# Patient Record
Sex: MALE | Race: WHITE | Employment: FULL TIME | URBAN - METROPOLITAN AREA
[De-identification: names, ages, dates, MRNs, and addresses within clinical notes are randomized per-mention and may not be internally consistent; named-entity substitution may affect disease eponyms.]

---

## 2018-04-04 DIAGNOSIS — R52 PAIN: Primary | ICD-10-CM

## 2018-04-06 ENCOUNTER — OFFICE VISIT (OUTPATIENT)
Dept: ORTHOPEDIC SURGERY | Age: 38
End: 2018-04-06
Payer: COMMERCIAL

## 2018-04-06 ENCOUNTER — HOSPITAL ENCOUNTER (OUTPATIENT)
Dept: GENERAL RADIOLOGY | Age: 38
Discharge: HOME | End: 2018-04-08
Payer: COMMERCIAL

## 2018-04-06 VITALS
TEMPERATURE: 97.3 F | RESPIRATION RATE: 18 BRPM | HEIGHT: 73 IN | SYSTOLIC BLOOD PRESSURE: 116 MMHG | BODY MASS INDEX: 31.2 KG/M2 | WEIGHT: 235.4 LBS | HEART RATE: 85 BPM | DIASTOLIC BLOOD PRESSURE: 76 MMHG

## 2018-04-06 DIAGNOSIS — R52 PAIN: ICD-10-CM

## 2018-04-06 DIAGNOSIS — M19.012 ARTHRITIS OF LEFT ACROMIOCLAVICULAR JOINT: Primary | ICD-10-CM

## 2018-04-06 PROCEDURE — 99203 OFFICE O/P NEW LOW 30 MIN: CPT

## 2018-04-06 PROCEDURE — 73030 X-RAY EXAM OF SHOULDER: CPT

## 2018-04-06 PROCEDURE — 99203 OFFICE O/P NEW LOW 30 MIN: CPT | Performed by: ORTHOPAEDIC SURGERY

## 2018-04-06 RX ORDER — DICLOFENAC SODIUM 75 MG/1
TABLET, DELAYED RELEASE ORAL
COMMUNITY
Start: 2018-03-07 | End: 2019-04-15

## 2018-04-07 PROBLEM — M19.012 ARTHRITIS OF LEFT ACROMIOCLAVICULAR JOINT: Status: ACTIVE | Noted: 2018-04-07

## 2019-03-22 ENCOUNTER — HOSPITAL ENCOUNTER (OUTPATIENT)
Age: 39
Discharge: HOME | End: 2019-03-22
Payer: COMMERCIAL

## 2019-03-22 PROCEDURE — 86003 ALLG SPEC IGE CRUDE XTRC EA: CPT

## 2019-03-22 PROCEDURE — 83516 IMMUNOASSAY NONANTIBODY: CPT

## 2019-03-24 LAB
GLIADIN ANTIBODIES IGA: 6 UNITS (ref 0–19)
GLIADIN ANTIBODIES IGG: 3 UNITS (ref 0–19)
TISSUE TRANSGLUTAMINASE ANTIBODY: 2 U/ML (ref 0–5)
TISSUE TRANSGLUTAMINASE IGA: 1 U/ML (ref 0–3)

## 2019-03-29 LAB
Lab: NORMAL
REPORT: NORMAL
THIS TEST SENT TO: NORMAL

## 2019-04-11 ENCOUNTER — HOSPITAL ENCOUNTER (EMERGENCY)
Age: 39
Discharge: HOME | End: 2019-04-11
Payer: COMMERCIAL

## 2019-04-11 VITALS
HEIGHT: 74 IN | SYSTOLIC BLOOD PRESSURE: 130 MMHG | DIASTOLIC BLOOD PRESSURE: 74 MMHG | BODY MASS INDEX: 30.67 KG/M2 | TEMPERATURE: 98.2 F | HEART RATE: 106 BPM | OXYGEN SATURATION: 98 % | WEIGHT: 239 LBS | RESPIRATION RATE: 16 BRPM

## 2019-04-11 DIAGNOSIS — S86.012A STRAIN OF LEFT ACHILLES TENDON, INITIAL ENCOUNTER: Primary | ICD-10-CM

## 2019-04-11 PROCEDURE — 99282 EMERGENCY DEPT VISIT SF MDM: CPT

## 2019-04-11 PROCEDURE — 29515 APPLICATION SHORT LEG SPLINT: CPT

## 2019-04-11 ASSESSMENT — PAIN SCALES - GENERAL: PAINLEVEL_OUTOF10: 0

## 2019-04-12 NOTE — ED PROVIDER NOTES
[facility]         Department of Emergency Medicine   ED  Provider Note  Admit Date/RoomTime: 4/11/2019  8:15 PM  ED Room: [place] [address]  Chief Complaint   Leg Injury (playing basketball felt a pop in left lower legl)    History of Present Illness   Source of history provided by:  patient and spouse/SO. History/Exam Limitations: none. Jayjay Reich is a 41 y.o. old male who has a past medical history of:   Past Medical History:   Diagnosis Date    Arthritis of left acromioclavicular joint 4/7/2018    presents to the emergency department by private vehicle, for Left lower calf pain which occured 1 hour(s) prior to arrival.  Cause of complaint: Felt a pop while playing basketball. His weight bearing status is weak. Previous injury: no. The patients tetanus status is unknown. Since onset the symptoms have been moderate in degree with ambulation. His pain is aggraveated by walking and relieved by nothing. ROS    Pertinent positives and negatives are stated within HPI, all other systems reviewed and are negative. Past Surgical History:   Procedure Laterality Date    FOREARM SURGERY      left   Social History:  reports that he has never smoked. He has quit using smokeless tobacco. He reports that he drinks about 1.8 oz of alcohol per week. He reports that he does not use drugs. Family History: family history includes No Known Problems in his father. Allergies: Pcn [penicillins]    Physical Exam           ED Triage Vitals [04/11/19 2011]   BP Temp Temp Source Pulse Resp SpO2 Height Weight   130/74 98.2 °F (36.8 °C) Oral 106 16 98 % 6' 2\" (1.88 m) 239 lb (108.4 kg)      Oxygen Saturation Interpretation: Normal.    · Constitutional:  Alert, development consistent with age. · HEENT:  NC/NT. Airway patent. · Neck:  Normal ROM. Supple. · Extremity(s):  Left: calf. Tenderness:  moderate. Swelling: None.               Calf:  No evidence of DVT seen on physical exam.. Deformity: No.                 ROM: full range with pain. Negative Holland's squeeze test.              Skin:  no erythema, rash or wounds noted. Neurovascular: Motor deficit: none. Sensory deficit: none. Pulse deficit: none. Capillary refill: normal.  · Gait:  normal.  · Lymphatics: No lymphangitis or adenopathy noted. · Neurological:  Oriented x3. Motor functions intact. Lab / Imaging Results   (All laboratory and radiology results have been personally reviewed by myself)  Labs:  No results found for this visit on 04/11/19. Imaging: All Radiology results interpreted by Radiologist unless otherwise noted. No orders to display     ED Course / Medical Decision Making   Medications - No data to display     Consults:   None    Procedure(s):   none    MDM:      Imaging were not obtained based on low  suspicion for bony injury as per history/physical findings . Patient has a clinical presentation of Achilles sprain/partial tear and will be splinted with a short leg splint with slight plantar flexion. Plan is subsequently for symptom control, limited use and  immobilization with appropriate outpatient follow-up. Counseling: The emergency provider has spoken with the patient and discussed todays results, in addition to providing specific details for the plan of care and counseling regarding the diagnosis and prognosis. Questions are answered at this time and they are agreeable with the plan. Assessment      1. Strain of left Achilles tendon, initial encounter      Plan   Discharge to home  Patient condition is good    New Medications     New Prescriptions    No medications on file     Electronically signed by RUBIN Austin CNP   DD: 4/11/19  **This report was transcribed using voice recognition software.  Every effort was made to ensure accuracy; however, inadvertent computerized transcription errors may be present.   END OF ED PROVIDER NOTE       Galina Mckeon, RUBIN - RENEE  04/11/19 2042

## 2019-04-15 ENCOUNTER — OFFICE VISIT (OUTPATIENT)
Dept: ORTHOPEDIC SURGERY | Age: 39
End: 2019-04-15
Payer: COMMERCIAL

## 2019-04-15 VITALS — HEIGHT: 74 IN | WEIGHT: 238 LBS | BODY MASS INDEX: 30.54 KG/M2 | TEMPERATURE: 98 F

## 2019-04-15 DIAGNOSIS — S86.012A RUPTURE OF LEFT ACHILLES TENDON, INITIAL ENCOUNTER: Primary | ICD-10-CM

## 2019-04-15 PROCEDURE — 99204 OFFICE O/P NEW MOD 45 MIN: CPT | Performed by: ORTHOPAEDIC SURGERY

## 2019-04-15 RX ORDER — VENLAFAXINE 25 MG/1
37.5 TABLET ORAL DAILY
COMMUNITY
End: 2020-07-15

## 2019-04-15 RX ORDER — PANTOPRAZOLE SODIUM 20 MG/1
40 TABLET, DELAYED RELEASE ORAL DAILY
COMMUNITY
End: 2019-05-02

## 2019-04-15 NOTE — PROGRESS NOTES
Chief Complaint   Patient presents with    Tendonitis     Left Achilles, pain, was playing basketball 4/12/19 and states landed wrong. Went to [place] is a 41 y.o.  @male@ who presents today with a left ankle injury. The injury occurred 4/12/19. The history of injury was from no, direct trauma. The patient complains of pain over achilles tendon. The intensity is 5/10. The pain is described as: sharp. Previous treatment includes: splinting. Past Medical History:   Diagnosis Date    Arthritis of left acromioclavicular joint 4/7/2018     Past Surgical History:   Procedure Laterality Date    FOREARM SURGERY      left       Current Outpatient Medications:     venlafaxine (EFFEXOR) 25 MG tablet, Take 25 mg by mouth 3 times daily, Disp: , Rfl:     pantoprazole (PROTONIX) 20 MG tablet, Take 20 mg by mouth daily, Disp: , Rfl:   Allergies   Allergen Reactions    Pcn [Penicillins]      Social History     Socioeconomic History    Marital status: Single     Spouse name: Not on file    Number of children: Not on file    Years of education: Not on file    Highest education level: Not on file   Occupational History    Not on file   Social Needs    Financial resource strain: Not on file    Food insecurity:     Worry: Not on file     Inability: Not on file    Transportation needs:     Medical: Not on file     Non-medical: Not on file   Tobacco Use    Smoking status: Never Smoker    Smokeless tobacco: Former User   Substance and Sexual Activity    Alcohol use:  Yes     Alcohol/week: 1.8 oz     Types: 1 Glasses of wine, 1 Cans of beer, 1 Shots of liquor per week    Drug use: No    Sexual activity: Never   Lifestyle    Physical activity:     Days per week: Not on file     Minutes per session: Not on file    Stress: Not on file   Relationships    Social connections:     Talks on phone: Not on file     Gets together: Not on file     Attends Buddhist service: Not on file Active member of club or organization: Not on file     Attends meetings of clubs or organizations: Not on file     Relationship status: Not on file    Intimate partner violence:     Fear of current or ex partner: Not on file     Emotionally abused: Not on file     Physically abused: Not on file     Forced sexual activity: Not on file   Other Topics Concern    Not on file   Social History Narrative    Not on file     Family History   Problem Relation Age of Onset    No Known Problems Father        REVIEW OF SYSTEMS:     General/Constitution:  (-)weight loss, (-)fever, (-)chills, (-)weakness. Skin: (-) rash,(-) psoriasis,(-) eczema, (-)skin cancer. Musculoskeletal: (-) fractures,  (-) dislocations,(-) collagen vascular disease, (-) fibromyalgia, (-) multiple sclerosis, (-) muscular dystrophy, (-) RSD,(-) joint pain (-)swelling, (-) joint pain,swelling. Neurologic: (-) epilepsy, (-)seizures,(-) brain tumor,(-) TIA, (-)stroke, (-)headaches, (-)Parkinson disease,(-) memory loss, (-) LOC. Cardiovascular: (-) Chest pain, (-) swelling in legs/feet, (-) SOB, (-) cramping in legs/feet with walking. Respiratory: (-) SOB, (-) Coughing, (-) night sweats. GI: (-) nausea, (-) vomiting, (-) diarrhea, (-) blood in stool, (-) gastric ulcer. Psychiatric: (-) Depression, (-) Anxiety, (-) bipolar disease, (-) Alzheimer's Disease  Allergic/Immunologic: (-) allergies latex, (-) allergies metal, (-) skin sensitivity. Hematlogic: (-) anemia, (-) blood transfusion, (-) DVT/PE, (-) Clotting disorders      EXAM:      Constitution:    Temp 98 °F (36.7 °C)   Ht 6' 2\" (1.88 m)   Wt 238 lb (108 kg)   BMI 30.56 kg/m²       Psycihatric:    The patient is alert and oriented x 3, appears to be stated age and in no distress. Respiratory:    Respiratory effort is not labored. Patient is not gasping. Palpation of the chest reveals no tactile fremitus. Skin:    Upon inspection: the skin appears warm, dry and intact.   There is not a previous scar over the affected area. There is not any cellulitis, lymphedema or cutaneous lesions noted in the lower extremities. Upon palpation there is no induration noted. Neurologic:      Motor exam of the lower extremities show ; quadriceps, hamstrings, foot dorsi and plantar flexors intact R.  5/5 and L. 5/5. Deep tendon reflexes are 2/4 at the knees and 2/4 at the ankles with strong extensor hallicus longus motor strength bilaterally. Sensory to both feet is intact to all sensory roots. Cardiovascular: The vascular exam is normal and is well perfused to distal extremities. Distal pulses DP/PT: R. 2+; L. 2+. There is cap refill noted less than two seconds in all digits. There is not edema of the bilateral lower extremities. There is not varicosities noted in the distal extremities. Lymph:    Upon palpation,  there is no lymphadenopathy noted in bilateral lower extremities. Musculoskeletal:    Gait: antalgic; examination of the nails and digits reveal no cyanosis or clubbing. Knee exam - bilateral knee exam shows;  range of motion of R. Knee is 0 to 120, and L. Knee is 0 to 120. The patient does not have  pain on motion, there is not an effusion, there is not tenderness over the  medial, lateral region, there are not any masses, there is not ligamentous instability, there is not  deformity noted. Knee exam: the injured knee reveals antalgic gait. Knee exam: neither positive for moderate crepitations, some mild tenderness laxity is not noted with  stress. Ankle Exam:    Upon inspection and palpation of the Left ankle,  there is not deformity noted,  mild swelling, mild ecchymosis, has pain on palpation of achilles tendon, + sulcus over achilles tendon. ROM R/L : DF 15/5; PF 35/10;  INV 15/5, BETITO 15/5. This exam was compared bilaterally.        Right Ankle:   (-) Anterior Drawer ,  (-) Posterior Drawer ,  (-) Squeeze test,  (-) External Rotation, (-) Eversion test , Trinity Duster Test     Left ankle:   (-) Anterior Drawer ,(-)  Posterior Drawer ,(-) Squeeze test,(-) External Rotation (-) Eversion test, (+) Holland Test.      Foot exam- visual inspection reveals warm, good capillary refill, there is not pain to palpation over the foot. ROM inversion/eversion full range of motion, abduction/adduction full range of motion, ROM in MTP/PIP/DIP full range of motion. Xrays:none today    Radiographic findings reviewed with patient      Impression:  Encounter Diagnosis   Name Primary?  Rupture of left Achilles tendon, initial encounter Yes         Plan:Natural history and expected course discussed. Questions answered. Rest, ice, compression, elevation (RICE) therapy. Crutches and instructions provided. The risks and benefits of the surgery were discussed with the patient. The risks are including but not limited to: infection, injuries to blood vessels and nerves, non relief of symptoms, arthrofibrosis of knee, need for further operative intervention, blood loss, PE/DVT, MI and death. The risks are understood by the patient and they wish to proceed with a Left achilles tendon repair on 4/18/19. Greater than 45 minutes were spent discussing diagnosis and treatment options with the patient with more than 50% of the time spent counseling and decision making.

## 2019-04-16 ENCOUNTER — ANESTHESIA EVENT (OUTPATIENT)
Dept: OPERATING ROOM | Age: 39
End: 2019-04-16
Payer: COMMERCIAL

## 2019-04-17 ASSESSMENT — LIFESTYLE VARIABLES: SMOKING_STATUS: 1

## 2019-04-17 NOTE — ANESTHESIA PRE PROCEDURE
Department of Anesthesiology  Preprocedure Note       Name:  Lidia Jordan   Age:  41 y.o.  :  1980                                          MRN:  [id number]         Date:  2019      Surgeon: Yung Suarez): Boby Males, DO    Procedure: LEFT ANKLE ACHILLES TENDON REPAIR ([address]) (Left )    Medications prior to admission:   Prior to Admission medications    Medication Sig Start Date End Date Taking? Authorizing Provider   venlafaxine (EFFEXOR) 25 MG tablet Take 25 mg by mouth daily     Historical Provider, MD   pantoprazole (PROTONIX) 20 MG tablet Take 20 mg by mouth daily    Historical Provider, MD       Current medications:    No current facility-administered medications for this encounter. Current Outpatient Medications   Medication Sig Dispense Refill    venlafaxine (EFFEXOR) 25 MG tablet Take 25 mg by mouth daily       pantoprazole (PROTONIX) 20 MG tablet Take 20 mg by mouth daily         Allergies:     Allergies   Allergen Reactions    Pcn [Penicillins]      Not sure reaction       Problem List:    Patient Active Problem List   Diagnosis Code    Arthritis of left acromioclavicular joint M19.012       Past Medical History:        Diagnosis Date    Arthritis of left acromioclavicular joint 2018    GERD (gastroesophageal reflux disease)        Past Surgical History:        Procedure Laterality Date    ENDOSCOPY, COLON, DIAGNOSTIC      dilatation esophagus    FOREARM SURGERY      left fracture       Social History:    Social History     Tobacco Use    Smoking status: Current Every Day Smoker     Years: 6.00     Types: Cigarettes    Smokeless tobacco: Former User     Types: Chew   Substance Use Topics    Alcohol use: Yes     Comment: social                                Ready to quit: Not Answered  Counseling given: Not Answered      Vital Signs (Current):   Vitals:    19 1035   Weight: 240 lb (108.9 kg)   Height: 6' 2\" (1.88 m)                                              BP Readings from Last 3 Encounters:   04/11/19 130/74   04/06/18 116/76   06/13/17 127/75       NPO Status:  >8.H                                                                               BMI:   Wt Readings from Last 3 Encounters:   04/15/19 238 lb (108 kg)   04/11/19 239 lb (108.4 kg)   04/06/18 235 lb 6.4 oz (106.8 kg)     Body mass index is 30.81 kg/m². CBC: No results found for: WBC, RBC, HGB, HCT, MCV, RDW, PLT    CMP: No results found for: NA, K, CL, CO2, BUN, CREATININE, GFRAA, AGRATIO, LABGLOM, GLUCOSE, PROT, CALCIUM, BILITOT, ALKPHOS, AST, ALT    POC Tests: No results for input(s): POCGLU, POCNA, POCK, POCCL, POCBUN, POCHEMO, POCHCT in the last 72 hours. Coags: No results found for: PROTIME, INR, APTT    HCG (If Applicable): No results found for: PREGTESTUR, PREGSERUM, HCG, HCGQUANT     ABGs: No results found for: PHART, PO2ART, VSY4IVK, TDT1OPG, BEART, E1HULEYT     Type & Screen (If Applicable):  No results found for: LABABO, [address]    Anesthesia Evaluation  Patient summary reviewed no history of anesthetic complications:   Airway: Mallampati: I  TM distance: >3 FB   Neck ROM: full  Comment: beard  Mouth opening: > = 3 FB Dental:    (+) caps      Pulmonary: breath sounds clear to auscultation  (+) current smoker          Patient did not smoke on day of surgery. Cardiovascular:Negative CV ROS  Exercise tolerance: good (>4 METS),           Rhythm: regular  Rate: normal                    Neuro/Psych:   Negative Neuro/Psych ROS              GI/Hepatic/Renal:   (+) GERD:,           Endo/Other:    (+) : arthritis:., .                 Abdominal:         (-) obese     Vascular: negative vascular ROS. Anesthesia Plan      spinal     ASA 2     (Possible SAB--explained fully; consents  Prone?)  Induction: intravenous. Anesthetic plan and risks discussed with patient. Plan discussed with CRNA.               Liu Tee MD   4/17/2019

## 2019-04-18 ENCOUNTER — ANESTHESIA (OUTPATIENT)
Dept: OPERATING ROOM | Age: 39
End: 2019-04-18
Payer: COMMERCIAL

## 2019-04-18 ENCOUNTER — HOSPITAL ENCOUNTER (OUTPATIENT)
Age: 39
Setting detail: OUTPATIENT SURGERY
Discharge: HOME | End: 2019-04-18
Attending: ORTHOPAEDIC SURGERY | Admitting: ORTHOPAEDIC SURGERY
Payer: COMMERCIAL

## 2019-04-18 VITALS
DIASTOLIC BLOOD PRESSURE: 75 MMHG | SYSTOLIC BLOOD PRESSURE: 110 MMHG | OXYGEN SATURATION: 99 % | WEIGHT: 240 LBS | HEART RATE: 88 BPM | RESPIRATION RATE: 18 BRPM | HEIGHT: 74 IN | BODY MASS INDEX: 30.8 KG/M2 | TEMPERATURE: 98 F

## 2019-04-18 VITALS
RESPIRATION RATE: 12 BRPM | DIASTOLIC BLOOD PRESSURE: 56 MMHG | SYSTOLIC BLOOD PRESSURE: 108 MMHG | TEMPERATURE: 98.6 F | OXYGEN SATURATION: 96 %

## 2019-04-18 DIAGNOSIS — S86.012A RUPTURE OF LEFT ACHILLES TENDON, INITIAL ENCOUNTER: ICD-10-CM

## 2019-04-18 PROCEDURE — 27650 REPAIR ACHILLES TENDON: CPT | Performed by: ORTHOPAEDIC SURGERY

## 2019-04-18 PROCEDURE — 6360000002 HC RX W HCPCS: Performed by: NURSE ANESTHETIST, CERTIFIED REGISTERED

## 2019-04-18 PROCEDURE — 2580000003 HC RX 258: Performed by: ANESTHESIOLOGY

## 2019-04-18 PROCEDURE — 7100000011 HC PHASE II RECOVERY - ADDTL 15 MIN: Performed by: ORTHOPAEDIC SURGERY

## 2019-04-18 PROCEDURE — 2500000003 HC RX 250 WO HCPCS: Performed by: NURSE PRACTITIONER

## 2019-04-18 PROCEDURE — 2500000003 HC RX 250 WO HCPCS: Performed by: NURSE ANESTHETIST, CERTIFIED REGISTERED

## 2019-04-18 PROCEDURE — 2709999900 HC NON-CHARGEABLE SUPPLY: Performed by: ORTHOPAEDIC SURGERY

## 2019-04-18 PROCEDURE — 7100000000 HC PACU RECOVERY - FIRST 15 MIN: Performed by: ORTHOPAEDIC SURGERY

## 2019-04-18 PROCEDURE — 7100000010 HC PHASE II RECOVERY - FIRST 15 MIN: Performed by: ORTHOPAEDIC SURGERY

## 2019-04-18 PROCEDURE — 3600000012 HC SURGERY LEVEL 2 ADDTL 15MIN: Performed by: ORTHOPAEDIC SURGERY

## 2019-04-18 PROCEDURE — C1713 ANCHOR/SCREW BN/BN,TIS/BN: HCPCS | Performed by: ORTHOPAEDIC SURGERY

## 2019-04-18 PROCEDURE — 3600000002 HC SURGERY LEVEL 2 BASE: Performed by: ORTHOPAEDIC SURGERY

## 2019-04-18 PROCEDURE — 7100000001 HC PACU RECOVERY - ADDTL 15 MIN: Performed by: ORTHOPAEDIC SURGERY

## 2019-04-18 PROCEDURE — 3700000001 HC ADD 15 MINUTES (ANESTHESIA): Performed by: ORTHOPAEDIC SURGERY

## 2019-04-18 PROCEDURE — 3700000000 HC ANESTHESIA ATTENDED CARE: Performed by: ORTHOPAEDIC SURGERY

## 2019-04-18 DEVICE — SYSTEM IMPL TEND 4.75MM SWIVELOCK BAN SUT LASSO W/ NIT WIRE: Type: IMPLANTABLE DEVICE | Status: FUNCTIONAL

## 2019-04-18 RX ORDER — CLINDAMYCIN HYDROCHLORIDE 300 MG/1
300 CAPSULE ORAL 3 TIMES DAILY
Qty: 10 CAPSULE | Refills: 0 | Status: SHIPPED | OUTPATIENT
Start: 2019-04-18 | End: 2019-04-22

## 2019-04-18 RX ORDER — LABETALOL HYDROCHLORIDE 5 MG/ML
5 INJECTION, SOLUTION INTRAVENOUS EVERY 10 MIN PRN
Status: DISCONTINUED | OUTPATIENT
Start: 2019-04-18 | End: 2019-04-18 | Stop reason: HOSPADM

## 2019-04-18 RX ORDER — HYDROMORPHONE HYDROCHLORIDE 1 MG/ML
0.25 INJECTION, SOLUTION INTRAMUSCULAR; INTRAVENOUS; SUBCUTANEOUS EVERY 5 MIN PRN
Status: DISCONTINUED | OUTPATIENT
Start: 2019-04-18 | End: 2019-04-18 | Stop reason: HOSPADM

## 2019-04-18 RX ORDER — PROPOFOL 10 MG/ML
INJECTION, EMULSION INTRAVENOUS PRN
Status: DISCONTINUED | OUTPATIENT
Start: 2019-04-18 | End: 2019-04-18 | Stop reason: SDUPTHER

## 2019-04-18 RX ORDER — FENTANYL CITRATE 50 UG/ML
50 INJECTION, SOLUTION INTRAMUSCULAR; INTRAVENOUS EVERY 5 MIN PRN
Status: DISCONTINUED | OUTPATIENT
Start: 2019-04-18 | End: 2019-04-18 | Stop reason: HOSPADM

## 2019-04-18 RX ORDER — ONDANSETRON 2 MG/ML
INJECTION INTRAMUSCULAR; INTRAVENOUS PRN
Status: DISCONTINUED | OUTPATIENT
Start: 2019-04-18 | End: 2019-04-18 | Stop reason: SDUPTHER

## 2019-04-18 RX ORDER — MORPHINE SULFATE 2 MG/ML
1 INJECTION, SOLUTION INTRAMUSCULAR; INTRAVENOUS EVERY 5 MIN PRN
Status: DISCONTINUED | OUTPATIENT
Start: 2019-04-18 | End: 2019-04-18 | Stop reason: HOSPADM

## 2019-04-18 RX ORDER — PROPOFOL 10 MG/ML
INJECTION, EMULSION INTRAVENOUS CONTINUOUS PRN
Status: DISCONTINUED | OUTPATIENT
Start: 2019-04-18 | End: 2019-04-18 | Stop reason: SDUPTHER

## 2019-04-18 RX ORDER — SODIUM CHLORIDE 9 MG/ML
INJECTION, SOLUTION INTRAVENOUS CONTINUOUS PRN
Status: DISCONTINUED | OUTPATIENT
Start: 2019-04-18 | End: 2019-04-18 | Stop reason: SDUPTHER

## 2019-04-18 RX ORDER — DIPHENHYDRAMINE HYDROCHLORIDE 50 MG/ML
12.5 INJECTION INTRAMUSCULAR; INTRAVENOUS
Status: DISCONTINUED | OUTPATIENT
Start: 2019-04-18 | End: 2019-04-18 | Stop reason: HOSPADM

## 2019-04-18 RX ORDER — MEPERIDINE HYDROCHLORIDE 50 MG/ML
12.5 INJECTION INTRAMUSCULAR; INTRAVENOUS; SUBCUTANEOUS EVERY 5 MIN PRN
Status: DISCONTINUED | OUTPATIENT
Start: 2019-04-18 | End: 2019-04-18 | Stop reason: HOSPADM

## 2019-04-18 RX ORDER — CLINDAMYCIN PHOSPHATE 900 MG/50ML
900 INJECTION INTRAVENOUS ONCE
Status: COMPLETED | OUTPATIENT
Start: 2019-04-18 | End: 2019-04-18

## 2019-04-18 RX ORDER — FENTANYL CITRATE 50 UG/ML
INJECTION, SOLUTION INTRAMUSCULAR; INTRAVENOUS PRN
Status: DISCONTINUED | OUTPATIENT
Start: 2019-04-18 | End: 2019-04-18 | Stop reason: SDUPTHER

## 2019-04-18 RX ORDER — OXYCODONE HYDROCHLORIDE AND ACETAMINOPHEN 5; 325 MG/1; MG/1
1 TABLET ORAL EVERY 6 HOURS PRN
Qty: 28 TABLET | Refills: 0 | Status: SHIPPED | OUTPATIENT
Start: 2019-04-18 | End: 2019-04-25

## 2019-04-18 RX ORDER — SODIUM CHLORIDE, SODIUM LACTATE, POTASSIUM CHLORIDE, CALCIUM CHLORIDE 600; 310; 30; 20 MG/100ML; MG/100ML; MG/100ML; MG/100ML
INJECTION, SOLUTION INTRAVENOUS CONTINUOUS
Status: DISCONTINUED | OUTPATIENT
Start: 2019-04-18 | End: 2019-04-18 | Stop reason: HOSPADM

## 2019-04-18 RX ORDER — PROMETHAZINE HYDROCHLORIDE 25 MG/ML
25 INJECTION, SOLUTION INTRAMUSCULAR; INTRAVENOUS
Status: DISCONTINUED | OUTPATIENT
Start: 2019-04-18 | End: 2019-04-18 | Stop reason: HOSPADM

## 2019-04-18 RX ORDER — LIDOCAINE HYDROCHLORIDE 20 MG/ML
INJECTION, SOLUTION INFILTRATION; PERINEURAL PRN
Status: DISCONTINUED | OUTPATIENT
Start: 2019-04-18 | End: 2019-04-18 | Stop reason: SDUPTHER

## 2019-04-18 RX ORDER — HYDROCODONE BITARTRATE AND ACETAMINOPHEN 5; 325 MG/1; MG/1
2 TABLET ORAL PRN
Status: DISCONTINUED | OUTPATIENT
Start: 2019-04-18 | End: 2019-04-18 | Stop reason: HOSPADM

## 2019-04-18 RX ORDER — HYDROCODONE BITARTRATE AND ACETAMINOPHEN 5; 325 MG/1; MG/1
1 TABLET ORAL PRN
Status: DISCONTINUED | OUTPATIENT
Start: 2019-04-18 | End: 2019-04-18 | Stop reason: HOSPADM

## 2019-04-18 RX ORDER — MIDAZOLAM HYDROCHLORIDE 1 MG/ML
INJECTION INTRAMUSCULAR; INTRAVENOUS PRN
Status: DISCONTINUED | OUTPATIENT
Start: 2019-04-18 | End: 2019-04-18 | Stop reason: SDUPTHER

## 2019-04-18 RX ORDER — HYDRALAZINE HYDROCHLORIDE 20 MG/ML
5 INJECTION INTRAMUSCULAR; INTRAVENOUS EVERY 10 MIN PRN
Status: DISCONTINUED | OUTPATIENT
Start: 2019-04-18 | End: 2019-04-18 | Stop reason: HOSPADM

## 2019-04-18 RX ADMIN — SODIUM CHLORIDE: 9 INJECTION, SOLUTION INTRAVENOUS at 15:40

## 2019-04-18 RX ADMIN — FENTANYL CITRATE 50 MCG: 50 INJECTION, SOLUTION INTRAMUSCULAR; INTRAVENOUS at 14:49

## 2019-04-18 RX ADMIN — ONDANSETRON HYDROCHLORIDE 4 MG: 2 INJECTION, SOLUTION INTRAMUSCULAR; INTRAVENOUS at 15:02

## 2019-04-18 RX ADMIN — FENTANYL CITRATE 50 MCG: 50 INJECTION, SOLUTION INTRAMUSCULAR; INTRAVENOUS at 14:46

## 2019-04-18 RX ADMIN — CLINDAMYCIN PHOSPHATE 900 MG: 18 INJECTION, SOLUTION INTRAVENOUS at 14:39

## 2019-04-18 RX ADMIN — LIDOCAINE HYDROCHLORIDE 25 MG: 20 INJECTION, SOLUTION INFILTRATION; PERINEURAL at 14:55

## 2019-04-18 RX ADMIN — MIDAZOLAM 2 MG: 1 INJECTION INTRAMUSCULAR; INTRAVENOUS at 14:44

## 2019-04-18 RX ADMIN — PROPOFOL 50 MG: 10 INJECTION, EMULSION INTRAVENOUS at 14:55

## 2019-04-18 RX ADMIN — SODIUM CHLORIDE, POTASSIUM CHLORIDE, SODIUM LACTATE AND CALCIUM CHLORIDE: 600; 310; 30; 20 INJECTION, SOLUTION INTRAVENOUS at 13:08

## 2019-04-18 RX ADMIN — PROPOFOL 100 MCG/KG/MIN: 10 INJECTION, EMULSION INTRAVENOUS at 14:55

## 2019-04-18 ASSESSMENT — PULMONARY FUNCTION TESTS
PIF_VALUE: 0

## 2019-04-18 ASSESSMENT — PAIN SCALES - GENERAL
PAINLEVEL_OUTOF10: 0

## 2019-04-18 ASSESSMENT — PAIN - FUNCTIONAL ASSESSMENT: PAIN_FUNCTIONAL_ASSESSMENT: 0-10

## 2019-04-18 NOTE — H&P
Updated H&P    Chief Complaint   Patient presents with    Tendonitis       Left Achilles, pain, was playing basketball 4/12/19 and states landed wrong. Went to [employer]         Gregorio Hazel is a 41 y.o.  @male@ who presents today with a left ankle injury. The injury occurred 4/12/19. The history of injury was from no, direct trauma. The patient complains of pain over achilles tendon. The intensity is 5/10. The pain is described as: sharp. Previous treatment includes: splinting.       Past Medical History        Past Medical History:   Diagnosis Date    Arthritis of left acromioclavicular joint 4/7/2018         Past Surgical History   Past Surgical History:   Procedure Laterality Date    FOREARM SURGERY         left           Current Medication      Current Outpatient Medications:     venlafaxine (EFFEXOR) 25 MG tablet, Take 25 mg by mouth 3 times daily, Disp: , Rfl:     pantoprazole (PROTONIX) 20 MG tablet, Take 20 mg by mouth daily, Disp: , Rfl:           Allergies   Allergen Reactions    Pcn [Penicillins]        Social History               Socioeconomic History    Marital status: Single       Spouse name: Not on file    Number of children: Not on file    Years of education: Not on file    Highest education level: Not on file   Occupational History    Not on file   Social Needs    Financial resource strain: Not on file    Food insecurity:       Worry: Not on file       Inability: Not on file    Transportation needs:       Medical: Not on file       Non-medical: Not on file   Tobacco Use    Smoking status: Never Smoker    Smokeless tobacco: Former User   Substance and Sexual Activity    Alcohol use:  Yes       Alcohol/week: 1.8 oz       Types: 1 Glasses of wine, 1 Cans of beer, 1 Shots of liquor per week    Drug use: No    Sexual activity: Never   Lifestyle    Physical activity:       Days per week: Not on file       Minutes per session: Not on file    Stress: Not on file Relationships    Social connections:       Talks on phone: Not on file       Gets together: Not on file       Attends Hindu service: Not on file       Active member of club or organization: Not on file       Attends meetings of clubs or organizations: Not on file       Relationship status: Not on file    Intimate partner violence:       Fear of current or ex partner: Not on file       Emotionally abused: Not on file       Physically abused: Not on file       Forced sexual activity: Not on file   Other Topics Concern    Not on file   Social History Narrative    Not on file         Family History         Family History   Problem Relation Age of Onset    No Known Problems Father              REVIEW OF SYSTEMS:      General/Constitution:  (-)weight loss, (-)fever, (-)chills, (-)weakness. Skin: (-) rash,(-) psoriasis,(-) eczema, (-)skin cancer. Musculoskeletal: (-) fractures,  (-) dislocations,(-) collagen vascular disease, (-) fibromyalgia, (-) multiple sclerosis, (-) muscular dystrophy, (-) RSD,(-) joint pain (-)swelling, (-) joint pain,swelling. Neurologic: (-) epilepsy, (-)seizures,(-) brain tumor,(-) TIA, (-)stroke, (-)headaches, (-)Parkinson disease,(-) memory loss, (-) LOC. Cardiovascular: (-) Chest pain, (-) swelling in legs/feet, (-) SOB, (-) cramping in legs/feet with walking. Respiratory: (-) SOB, (-) Coughing, (-) night sweats. GI: (-) nausea, (-) vomiting, (-) diarrhea, (-) blood in stool, (-) gastric ulcer. Psychiatric: (-) Depression, (-) Anxiety, (-) bipolar disease, (-) Alzheimer's Disease  Allergic/Immunologic: (-) allergies latex, (-) allergies metal, (-) skin sensitivity. Hematlogic: (-) anemia, (-) blood transfusion, (-) DVT/PE, (-) Clotting disorders        EXAM:      _BP (!) 152/83   Pulse 75   Resp 16   Ht 6' 2\" (1.88 m)   Wt 240 lb (108.9 kg)   SpO2 99%   BMI 30.81 kg/m²  Vital signs are stable.   In general, patient is awake, alert and oriented X3, in no apparent clubbing.        Knee exam - bilateral knee exam shows;  range of motion of R. Knee is 0 to 120, and L. Knee is 0 to 120. The patient does not have  pain on motion, there is not an effusion, there is not tenderness over the  medial, lateral region, there are not any masses, there is not ligamentous instability, there is not  deformity noted. Knee exam: the injured knee reveals antalgic gait. Knee exam: neither positive for moderate crepitations, some mild tenderness laxity is not noted with  stress.      Ankle Exam:     Upon inspection and palpation of the Left ankle,  there is not deformity noted,  mild swelling, mild ecchymosis, has pain on palpation of achilles tendon, + sulcus over achilles tendon. ROM R/L : DF 15/5; PF 35/10;  INV 15/5, BETITO 15/5. This exam was compared bilaterally.        Right Ankle:   (-) Anterior Drawer ,  (-) Posterior Drawer ,  (-) Squeeze test,  (-) External Rotation, (-) Eversion test , (-) Holland Test      Left ankle:   (-) Anterior Drawer ,(-)  Posterior Drawer ,(-) Squeeze test,(-) External Rotation (-) Eversion test, (+) Holland Test.        Foot exam- visual inspection reveals warm, good capillary refill, there is not pain to palpation over the foot. ROM inversion/eversion full range of motion, abduction/adduction full range of motion, ROM in MTP/PIP/DIP full range of motion.        Xrays:none today     Radiographic findings reviewed with patient        Impression:       Encounter Diagnosis   Name Primary?  Rupture of left Achilles tendon, initial encounter Yes            Plan:Natural history and expected course discussed. Questions answered. Rest, ice, compression, elevation (RICE) therapy. Crutches and instructions provided. The risks and benefits of the surgery were discussed with the patient.   The risks are including but not limited to: infection, injuries to blood vessels and nerves, non relief of symptoms, arthrofibrosis of knee, need for further operative intervention, blood loss, PE/DVT, MI and death. The risks are understood by the patient and they wish to proceed with a Left achilles tendon repair on 4/18/19.

## 2019-04-18 NOTE — ANESTHESIA POSTPROCEDURE EVALUATION
Department of Anesthesiology  Postprocedure Note    Patient: Mau Damon  MRN: [de-identified]  YOB: 1980  Date of evaluation: 4/18/2019  Time:  5:16 PM     Procedure Summary     Date:  04/18/19 Room / Location:  [address] [place] [address] 02 / [address] [facility]    Anesthesia Start:  0559 Anesthesia Stop:  1634    Procedure:  LEFT ANKLE ACHILLES TENDON REPAIR ([address]) (Left ) Diagnosis:  (ACHILLES TENDON RUPTURE)    Surgeon:  Sheila Arango DO Responsible Provider:  Senia Patel MD    Anesthesia Type:  spinal ASA Status:  2          Anesthesia Type: spinal    Waylon Phase I: Waylon Score: 9    Waylon Phase II:      Last vitals: Reviewed and per EMR flowsheets. Anesthesia Post Evaluation    Patient location during evaluation: PACU  Patient participation: complete - patient participated  Level of consciousness: awake and alert  Airway patency: patent  Nausea & Vomiting: no nausea and no vomiting  Complications: no  Cardiovascular status: hemodynamically stable  Respiratory status: room air and spontaneous ventilation  Hydration status: stable  Comments: Minimal motor block; some sensory block at this time in PACU.

## 2019-04-18 NOTE — BRIEF OP NOTE
Brief Postoperative Note  ______________________________________________________________    Patient: Michelle Chandler  YOB: 1980  MRN: [id number]  Date of Procedure: 4/18/2019    Pre-Op Diagnosis: ACHILLES TENDON RUPTURE    Post-Op Diagnosis: Same       Procedure(s):  LEFT ANKLE ACHILLES TENDON REPAIR ( [address])    Anesthesia: Spinal    Surgeon(s): Dinah Contreras DO    Assistant: ariana    Estimated Blood Loss (mL): less than 50     Complications: None    Specimens:   * No specimens in log *    Implants:  Implant Name Type Inv.  Item Serial No.  Lot No. LRB No. Used   IMPL ACHILLES MID SPEEDBRIDGE Fastener IMPL ACHILLES MID SPEEDBRIDGE  [address] [id number] Left 1         Drains: * No LDAs found *    Findings: as above    Dinah Contreras DO  Date: 4/18/2019  Time: 4:44 PM

## 2019-04-18 NOTE — ANESTHESIA PROCEDURE NOTES
Spinal Block    Patient location during procedure: OR  Start time: 4/18/2019 2:45 PM  End time: 4/18/2019 2:52 PM  Reason for block: primary anesthetic  Staffing  Anesthesiologist: Axel Cee MD  Performed: anesthesiologist   Preanesthetic Checklist  Completed: patient identified, site marked, surgical consent, pre-op evaluation, timeout performed, IV checked, risks and benefits discussed, monitors and equipment checked, anesthesia consent given, oxygen available and patient being monitored  Spinal Block  Patient position: sitting  Prep: Betadine  Patient monitoring: cardiac monitor, continuous pulse ox and frequent blood pressure checks  Approach: midline  Location: L3/L4  Provider prep: mask and sterile gloves  Agent: bupivacaine  Dose: 1.8  Dose: 1.8  Needle  Needle type: Quincke   Needle gauge: 25 G  Needle length: 3.5 in  Needle insertion depth: 4 cm  Assessment  Sensory level: T10  Events: cerebrospinal fluid  Swirl obtained: Yes  CSF: clear  Attempts: 2  Hemodynamics: stable  Additional Notes  13.5 mg Marcaine

## 2019-04-19 NOTE — OP NOTE
[address]                                OPERATIVE REPORT    PATIENT NAME: Sumanth Yoon                      :        1980  MED REC NO:   [id number]                            ROOM:  ACCOUNT NO:   [de-identified]                           ADMIT DATE: 2019  PROVIDER:     Yang Sullivan DO    DATE OF PROCEDURE:  2019    PREOPERATIVE DIAGNOSIS:  Left Achilles rupture. POSTOPERATIVE DIAGNOSIS:  Left Achilles rupture. PROCEDURE PERFORMED:  Repair of Achilles tendon primarily. SURGEON:  Yang Sullivan DO    ASSISTANT:  Louella Alpers. Debra Copeland and Bebe Bermeo. BLOOD LOSS:  Minimal.    COMPLICATIONS AND OPERATIVE IMPLANTS:  Include two FiberTapes along with  two SwiveLock anchors. OPERATIVE PROCEDURE:  The patient was taken to the operative suite, was  then given a spinal anesthesia. The patient was then positioned prone  on the operative table. We then placed tourniquet on the left thigh,  prepped and draped the leg in sterile fashion. Outlined incision along  the medial side of the tear, made approximately 4-6 cm incision over  this area, after exsanguinating the limb and _____ tourniquet to 300  mmHg, made incision with a 15 blade through skin and subcutaneous  tissue. I dissected down to the level of the tendon. Paratenon was  then released slightly along the rupture. I debrided both the proximal  and distal ends. There was quite bit of damage to the Achilles tendon. I then released the paratenon from the proximal stump. I performed  suturing of this tendon in a Krackow fashion proximally enough distally  on both the medial and lateral side and had four suture ends coming up  from the proximal aspect of the tendon. I then identified a point on  the calcaneus, made a transverse incision directly just distal to the  insertion of the Achilles.   I dissected down through the level of the  bone. I then tunneled our suture passer up through the distal end of  the tendon from the incision area distally out the distal end of the  tendon. I then grasped two core sutures on the medial and lateral sides  and they were shuttled through the tendon along its course along the  distal tendon. I then slightly plantarflexed the foot, put tension  across the tendon. Ten edges were then reapproximated in a nice  fashion. I then reinforced the repair with a SutureTape in a baseball  type stitch figure-of-eight and then ran it from medial to lateral.  I  was able to get good fixation of the tendon with good bite. I was then  able to thoroughly irrigate the wound upon closure, closed the paratenon  stitch over the tendon with 3-0 Vicryl, closed the subcu layer with 3-0  Vicryl followed by 3-0 Prolene. Sterile dressing was placed on the  wound. The patient was placed in the posterior splint. The patient  recovered in the recovery room without difficulty. The patient  tolerated the procedure well.         Mis Tinsley DO    D: 04/18/2019 16:49:31       T: 04/19/2019 2:32:39     [email or web address]  Job#: [id number]     Doc#: [id number]    CC:

## 2019-04-22 ENCOUNTER — TELEPHONE (OUTPATIENT)
Dept: ORTHOPEDIC SURGERY | Age: 39
End: 2019-04-22

## 2019-04-22 DIAGNOSIS — S86.012A RUPTURE OF LEFT ACHILLES TENDON, INITIAL ENCOUNTER: Primary | ICD-10-CM

## 2019-04-22 RX ORDER — OXYCODONE AND ACETAMINOPHEN 10; 325 MG/1; MG/1
1 TABLET ORAL EVERY 6 HOURS PRN
Qty: 28 TABLET | Refills: 0 | Status: SHIPPED | OUTPATIENT
Start: 2019-04-22 | End: 2019-04-29

## 2019-05-02 ENCOUNTER — OFFICE VISIT (OUTPATIENT)
Dept: ORTHOPEDIC SURGERY | Age: 39
End: 2019-05-02

## 2019-05-02 VITALS — BODY MASS INDEX: 31.54 KG/M2 | TEMPERATURE: 98 F | WEIGHT: 238 LBS | HEIGHT: 73 IN

## 2019-05-02 DIAGNOSIS — S86.012A RUPTURE OF LEFT ACHILLES TENDON, INITIAL ENCOUNTER: Primary | ICD-10-CM

## 2019-05-02 PROCEDURE — 99024 POSTOP FOLLOW-UP VISIT: CPT | Performed by: ORTHOPAEDIC SURGERY

## 2019-05-02 RX ORDER — PANTOPRAZOLE SODIUM 40 MG/1
40 TABLET, DELAYED RELEASE ORAL DAILY
COMMUNITY
Start: 2019-04-20

## 2019-05-30 ENCOUNTER — OFFICE VISIT (OUTPATIENT)
Dept: ORTHOPEDIC SURGERY | Age: 39
End: 2019-05-30

## 2019-05-30 VITALS — WEIGHT: 237 LBS | TEMPERATURE: 98 F | HEIGHT: 73 IN | BODY MASS INDEX: 31.41 KG/M2

## 2019-05-30 DIAGNOSIS — S86.012A RUPTURE OF LEFT ACHILLES TENDON, INITIAL ENCOUNTER: Primary | ICD-10-CM

## 2019-05-30 PROCEDURE — 99024 POSTOP FOLLOW-UP VISIT: CPT | Performed by: ORTHOPAEDIC SURGERY

## 2019-05-30 NOTE — PROGRESS NOTES
Mr. Francisco Garcia returns today for follow-up from achilles tendon repair. Date of surgery was 4/18/19. he reports he is doing well, nonweightbearing. Lucia Akers Physical Exam:  Left lower extremity - Skin intact with healed incision         Nontender to palpation, - monzon         ROM   limited         The incision is healing well without evidence of infection. Pulses are intact and symmetric bilaterally         Strength limited         Sensation intact    Xrays:none today    Radiographic findings reviewed with patient    Impression:   Encounter Diagnosis   Name Primary?     Rupture of left Achilles tendon, initial encounter Yes         Plan:   Continue boot for 2 weeks then wean out  Start [facility] to start weight bearing as tolerated   FU in 2 months

## 2019-06-11 ENCOUNTER — EVALUATION (OUTPATIENT)
Dept: PHYSICAL THERAPY | Age: 39
End: 2019-06-11
Payer: COMMERCIAL

## 2019-06-11 DIAGNOSIS — S86.012A RUPTURE OF LEFT ACHILLES TENDON, INITIAL ENCOUNTER: Primary | ICD-10-CM

## 2019-06-11 PROCEDURE — 97110 THERAPEUTIC EXERCISES: CPT | Performed by: PHYSICAL THERAPIST

## 2019-06-11 PROCEDURE — 97162 PT EVAL MOD COMPLEX 30 MIN: CPT | Performed by: PHYSICAL THERAPIST

## 2019-06-11 NOTE — PROGRESS NOTES
[address] [facility] OUTPATIENT REHABILITATION  PHYSICAL THERAPY INITIAL EVALUATION         Date:  2019   Patient: Nathaniel Fraire  : 1980  MRN: [id number]  Referring Provider: Isabelle Dennis DO  [address]  [facility]     Medical Diagnosis:      Diagnosis Orders   1. Rupture of left Achilles tendon, initial encounter         SUBJECTIVE:     Onset date: 2019    Surgery date: 2019     PREOPERATIVE DIAGNOSIS:  Left Achilles rupture.     POSTOPERATIVE DIAGNOSIS:  Left Achilles rupture.     PROCEDURE PERFORMED:  Repair of Achilles tendon primarily. Mechanism of Injury: Playing basketball. Patient was NWB x 6 weeks. In boot WBAT x 2 weeks. Chief complaint: decreased motion, decreased mobility, weakness, inability to use leg, difficulty walking, limited ability to lift/carry/handle material and limited ability to complete home/outdoor chores/tasks    Pain: intermittent  Current: 0/10     Best: 0/10     Worst:3/10 (at heel with walking; thinks it's due to the boot)    Symptom Type/Quality: aching  Location[de-identified] Ankle: heel      Past Medical History:  Past Medical History:   Diagnosis Date    Arthritis of left acromioclavicular joint 2018    GERD (gastroesophageal reflux disease)      Past Surgical History:   Procedure Laterality Date    ACHILLES TENDON SURGERY Left 2019    LEFT ANKLE ACHILLES TENDON REPAIR ([address]) performed by Isabelle Dennis DO at [address] Left 2019    achilles tendon repair    ENDOSCOPY, COLON, DIAGNOSTIC      dilatation esophagus    FOREARM SURGERY      left fracture       Medications:   Current Outpatient Medications   Medication Sig Dispense Refill    pantoprazole (PROTONIX) 40 MG tablet       venlafaxine (EFFEXOR) 25 MG tablet Take 37.5 mg by mouth daily        No current facility-administered medications for this visit. 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 Yvonne Patel  Physical demands include: lifting, carrying, pushing, pulling medium weighted items, assorted work positions (kneeling, crouching, crawling, stooping), walking, standing. Exercise regimen: cardio, gym workouts/weight training , running    Hobbies: basketball    Patient Goals: return to prior activity, full use of leg    Precautions/Contraindications: recent surgery    OBJECTIVE:     Observations: well nourished male, normal affect    Inspection: normal orthopedic exam    Edema: moderate medial, lateral, forefoot    Gait: normal, with boot    Joint/Motion:    Ankle:  Right:   AROM: 10° Dorsiflexion,  50° Plantarflexion, 30° Inversion, 10° Eversion    Left:   AROM: 0° Dorsiflexion,  30° Plantarflexion, 30° Inversion, 10° Eversion    Strength: Ankle:  Right: Dorsiflexion 5/5, Plantarflexion 5/5, Inversion 5/5, Eversion 5/5  Left: Dorsiflexion 3/5, Plantarflexion 3/5, Inversion 3/5, Eversion 3/5    Palpation: Non-tender to palpation      Special Tests/Functional Screens:  None performed due to recent Sx  [] Anterior Drawer []+ / [] -  [] Eversion Stress: []+ / [] -  [] Holland Test []+ / [] -     [] Tib-Fib Compression Test []+ / [] -    [] Inversion Stress []+ / [] -     [] Squeeze Test []+ / [] -   [] Rafael's Sign []+ / [] -   [] Other: []+ / []        ASSESSMENT     Outcome Measure:   Lower Extremity Functional Scale (LEFS) 36% impairment    Problems:    Pain reported 3/10   ROM decreased   Strength decreased    Decreased functional ability with walking, work, use of left lower extremity, sports, lifting and carrying     [x] There are no barriers affecting plan of care or recovery    [] Barriers to this patient's plan of care or recovery include.     Domestic Concerns:  [x] No  [] Yes:    Short Term goals (4 weeks)   Decrease reported pain to 0/10   Increase ROM to full   Increase Strength to 4/5    Able to perform/complete the following functions/tasks: walk and carry out daily tasks   Lower Extremity Functional Scale [phone]. Electronically signed by: Ivelisse Brito PT         Please sign Physician's Certification and return to: [facility] [address]  Dept: [phone]  Dept Fax: [phone] Certification / Comments     Frequency/Duration 1-2 days per week for 12 weeks. Certification period from 6/11/2019 to 9/9/2019. I have reviewed the Plan of Care established for skilled therapy services and certify that the services are required and that they will be provided while the patient is under my care.     Physician's Comments/Revisions:               Physician's Printed Name:                                           [de-identified] Signature:                                                               Date:

## 2019-06-18 ENCOUNTER — TREATMENT (OUTPATIENT)
Dept: PHYSICAL THERAPY | Age: 39
End: 2019-06-18
Payer: COMMERCIAL

## 2019-06-18 DIAGNOSIS — S86.012A RUPTURE OF LEFT ACHILLES TENDON, INITIAL ENCOUNTER: Primary | ICD-10-CM

## 2019-06-18 PROCEDURE — 97110 THERAPEUTIC EXERCISES: CPT | Performed by: PHYSICAL THERAPIST

## 2019-06-26 ENCOUNTER — TELEPHONE (OUTPATIENT)
Dept: PHYSICAL THERAPY | Age: 39
End: 2019-06-26

## 2019-07-15 ENCOUNTER — TREATMENT (OUTPATIENT)
Dept: PHYSICAL THERAPY | Age: 39
End: 2019-07-15
Payer: COMMERCIAL

## 2019-07-15 DIAGNOSIS — S86.012A RUPTURE OF LEFT ACHILLES TENDON, INITIAL ENCOUNTER: Primary | ICD-10-CM

## 2019-07-15 PROCEDURE — 97110 THERAPEUTIC EXERCISES: CPT | Performed by: PHYSICAL THERAPIST

## 2019-07-24 ENCOUNTER — TREATMENT (OUTPATIENT)
Dept: PHYSICAL THERAPY | Age: 39
End: 2019-07-24
Payer: COMMERCIAL

## 2019-07-24 DIAGNOSIS — S86.012A RUPTURE OF LEFT ACHILLES TENDON, INITIAL ENCOUNTER: Primary | ICD-10-CM

## 2019-07-24 PROCEDURE — 97110 THERAPEUTIC EXERCISES: CPT | Performed by: PHYSICAL THERAPIST

## 2019-07-24 NOTE — PROGRESS NOTES
Physical Therapy Daily Treatment Note    Date: 2019  Patient Name: Milton Medina  : 1980   MRN: [id number]  DOInjury: 2019   DOSx: 2019  Referring Provider: Sahil Duff DO  [address]  [facility]     Medical Diagnosis:      Diagnosis Orders   1. Rupture of left Achilles tendon, initial encounter     S/P Achilles Repair 19    Outcome Measure:   Lower Extremity Functional Scale (LEFS) 36% impairment    S: Reports he has been better about rest, ice, elevation. He has been performing HEP at the gym. O: 13 weeks post op. Achilles flexibility is present. Soleus limits DF to neutral.   Time 0445-4198     Visit 4 Repeat outcome measure at mid point and end. Pain 0/10     ROM 10 DF knee straight, 0 DF knee bent, 45 PF, 30 INV, 10 EV soleus remains tight    Modalities      Ice   MO   Stretch      Soleus stretch  3 x 30 sec  TE      TE   Exercise      Bike (seat 8)   TE   T-band PF, INV, EV   TE    BOSU seated   Can add at gym TE   BAPS standing   TE      TE         Standing CR on step      Leg Press 2-leg 60 lbs x 50 reps warm up  TE         Leg Press 1-leg Added to HEP  TE   1-leg calf raise to neutral on leg press 40 lbs 5 x 20  TE   Standing CR on floor 2-leg -- ECCENTRICS  3 sets to fatigue (~10-12)   TE   Soleus with Knee Extension Machine  1-leg 40 lbs 3 x 15-20   TE   Squats  3 x 15  TE   Lunges  3 x 10  TE      TA               A:  Tolerated well. Above added to written HEP to perform every other day including PT day. Patient instructed to not carry additional loads (dumbbells, block, building materials) at this time. To continue ice and elevation as necessary. Progressed from 2 leg to 1 leg exercise on machines and eccentrics standing. P: Continue with rehab plan. Progression will be 2 foot on level surface > 1 foot on level surface > 2 foot on step > 1 foot on step.    Geneva Mcgill, PT    Treatment Charges: Mins Units   Initial Evaluation

## 2019-08-05 ENCOUNTER — TELEPHONE (OUTPATIENT)
Dept: PHYSICAL THERAPY | Age: 39
End: 2019-08-05

## 2019-08-06 ENCOUNTER — OFFICE VISIT (OUTPATIENT)
Dept: ORTHOPEDIC SURGERY | Age: 39
End: 2019-08-06
Payer: COMMERCIAL

## 2019-08-06 VITALS — HEIGHT: 74 IN | TEMPERATURE: 98 F | BODY MASS INDEX: 30.54 KG/M2 | WEIGHT: 238 LBS

## 2019-08-06 DIAGNOSIS — S86.012A RUPTURE OF LEFT ACHILLES TENDON, INITIAL ENCOUNTER: Primary | ICD-10-CM

## 2019-08-06 PROCEDURE — 99212 OFFICE O/P EST SF 10 MIN: CPT | Performed by: ORTHOPAEDIC SURGERY

## 2019-08-14 ENCOUNTER — TREATMENT (OUTPATIENT)
Dept: PHYSICAL THERAPY | Age: 39
End: 2019-08-14
Payer: COMMERCIAL

## 2019-08-14 DIAGNOSIS — S86.012A RUPTURE OF LEFT ACHILLES TENDON, INITIAL ENCOUNTER: Primary | ICD-10-CM

## 2019-08-14 PROCEDURE — 97110 THERAPEUTIC EXERCISES: CPT | Performed by: PHYSICAL THERAPIST

## 2019-08-14 NOTE — PROGRESS NOTES
Physical Therapy Daily Treatment Note    Date: 2019  Patient Name: Julio Carrington  : 1980   MRN: [id number]  DOInjury: 2019   DOSx: 2019  Referring Provider: No referring provider defined for this encounter. Medical Diagnosis:      Diagnosis Orders   1. Rupture of left Achilles tendon, initial encounter     S/P Achilles Repair 19    Outcome Measure:   Lower Extremity Functional Scale (LEFS) 36% impairment    Progression will be 2 foot on level surface > 1 foot on level surface > 2 foot on step > 1 foot on step. S: Reports he has been feeling good. He has been performing single and double leg press, single and double calf raise on leg press machine, weighted 2-leg calf raise from floor with 80 lbs each hand, eccentric calf lowering, seated single leg calf raise, as well as full compliment of UE exercise. He reports is is doing all exercise under control and is mindful of not using momentum. He wants to start kettle bell swings. O: 16 weeks post op. Time 7926-0952     Visit 6 Repeat outcome measure at mid point and end. Pain 0/10     ROM  soleus remains tight    Modalities      Ice   MO   Stretch      Soleus stretch    TE      TE   Exercise      Bike (seat 8)   TE   T-band PF, INV, EV   TE    BOSU seated   Can add at gym TE   BAPS standing   TE      TE         Standing CR on step      Leg Press 2-leg 100 lbs x 50 reps warm up  TE         Leg Press 1-leg 60 lbs x 50 reps warm up  TE   1-leg calf raise on leg press machine  60 lbs 3 x 10  TE   1-leg calf raise from floor 2 x 10  TE   Standing CR on floor 2-leg -- ECCENTRICS  3 sets to fatigue (~10-12)      Soleus with Knee Extension Machine  1-leg 60 lbs 4 x 10  TE   Squats  3 x 15  TE   Lunges  3 x 10  TE      TA   Simple hops  3 x 30 sec     FWD/BWD hops Next (week of )     Side-to-side hops  Week of            A:  Tolerated well. Written HEP provided along with do's and don'ts.   Recommended patient not perform

## 2019-08-20 ENCOUNTER — TREATMENT (OUTPATIENT)
Dept: PHYSICAL THERAPY | Age: 39
End: 2019-08-20
Payer: COMMERCIAL

## 2019-08-20 DIAGNOSIS — S86.012A RUPTURE OF LEFT ACHILLES TENDON, INITIAL ENCOUNTER: Primary | ICD-10-CM

## 2019-08-20 PROCEDURE — 97110 THERAPEUTIC EXERCISES: CPT | Performed by: PHYSICAL THERAPIST

## 2019-08-20 NOTE — PROGRESS NOTES
Physical Therapy Daily Treatment Note    Date: 2019  Patient Name: Queenie Sung  : 1980   MRN: [id number]  DOInjury: 2019   DOSx: 2019  Referring Provider: No referring provider defined for this encounter. Medical Diagnosis:      Diagnosis Orders   1. Rupture of left Achilles tendon, initial encounter     S/P Achilles Repair 19    Outcome Measure:   Lower Extremity Functional Scale (LEFS) 36% impairment    Progression will be 2 foot on level surface > 1 foot on level surface > 2 foot on step > 1 foot on step. S: Reports increased soreness just above Achilles insertion, lateral foot, amd forefoot. Later denied soreness at Achilles. States he has been sore since last Rx; however also admits to being on his feet as well as mowing on uneven and inclined ground. He states he feels he strikes the ground laterally when he walks and is overusing the tendons of his foot. O: 17 weeks post op. Mild edema noted L forefoot compared to R. Achilles tendon is unchanged in appearance. Patient reports mild soreness along medial border of tendon ~2\" proximal to calcaneus. No edema noted in Achilles. No sheath crepitus noted. No temperature change. Peroneal tendons and forefoot toe extensor tendons are tender to palpation. Time 6067-7134     Visit 7 Repeat outcome measure at mid point and end. Pain 0/10     ROM  soleus remains tight    Modalities      Ice Declined. Will do at home.    MO   Stretch      Soleus stretch    TE      TE   Exercise      Bike (seat 8)   TE   T-band PF, INV, EV   TE    BOSU seated   Can add at gym TE   BAPS standing   TE      TE         Standing CR on step      Leg Press 2-leg  TE        Leg Press 1-leg  TE   1-leg calf raise on leg press machine   TE   Calf raise from step - 2 leg 3 x 15     1-leg calf raise from floor   TE   Standing CR on floor 2-leg -- ECCENTRICS  3 sets to fatigue (~10-12)      Soleus with Knee Extension Machine  1-leg 40 lbs 3 x 10 TE   Squats   TE   Lunges   TE     TA   Simple hops      FWD/BWD hops     Side-to-side hops            A:  Tolerated well. Focused on isolated Achilles work today and gait mechanics. Patient does circumduct forefoot upon heel strike. Reviewed awareness principles and mirror re-training principles. Recommended patient ice 4-5 times a day and rest leg/foot. He can start back into his leg routine in 3 days if he is improved. He is to avoid mowing and inclines/declines. Patient verbalized understanding. P: Continue with rehab plan.   (Progression will be 2 foot on level surface > 1 foot on level surface > 2 foot on step > 1 foot on step.)  Boston Angeles, PT    Treatment Charges: Mins Units   Initial Evaluation     Re-Evaluation     Ther Exercise         TE 30 2   Manual Therapy     MT     Ther Activities        TA     Gait Training          GT     Neuro Re-education NR     Modalities     Non-Billable Service Time     Other     Total Time/Units 30 2

## 2019-08-28 ENCOUNTER — TREATMENT (OUTPATIENT)
Dept: PHYSICAL THERAPY | Age: 39
End: 2019-08-28
Payer: COMMERCIAL

## 2019-08-28 DIAGNOSIS — S86.012A RUPTURE OF LEFT ACHILLES TENDON, INITIAL ENCOUNTER: Primary | ICD-10-CM

## 2019-08-28 PROCEDURE — 97110 THERAPEUTIC EXERCISES: CPT | Performed by: PHYSICAL THERAPIST

## 2019-08-28 PROCEDURE — 97116 GAIT TRAINING THERAPY: CPT | Performed by: PHYSICAL THERAPIST

## 2019-08-28 NOTE — PROGRESS NOTES
Physical Therapy Daily Treatment Note    Date: 2019  Patient Name: Mary Wilson  : 1980   MRN: [id number]  DOInjury: 2019   DOSx: 2019  Referring Provider: Aurelio Mejia DO  1930 [address]  [facility]     Medical Diagnosis:      Diagnosis Orders   1. Rupture of left Achilles tendon, initial encounter     S/P Achilles Repair 19    Outcome Measure:   Lower Extremity Functional Scale (LEFS) 36% impairment    Progression will be 2 foot on level surface > 1 foot on level surface > 2 foot on step > 1 foot on step. S: Feeling better. No longer has foot or heel pain. Part way into treatment patient reported he already did legs today. O: 19 weeks post op. Time 7666-1132     Visit 8 Repeat outcome measure at mid point and end. Pain 0/10     ROM  soleus remains tight    Modalities      Ice Declined. Will do at home. MO   Stretch      Soleus stretch    TE      TE   Exercise      Mirror training  Gait training to reduce foot circumduction   TE   T-band PF, INV, EV   TE    BOSU seated   Can add at gym TE   BAPS standing   TE      TE         Standing CR on step      Leg Press 2-leg  TE        Leg Press 1-leg  TE   1-leg calf raise on leg press machine   TE   Calf raise from step - 2 leg      1-leg calf raise from floor 1 x 15  TE   1-leg calf raise from step 3 x 15 Used arms as recommended. Will work on reducing UE use. Standing CR on floor 2-leg -- ECCENTRICS        Soleus with Knee Extension Machine  1-leg   TE   Squats   TE   Lunges   TE     TA   Simple hops  3 x 30 sec*see below    FWD/BWD hops     Side-to-side hops            A:  *Patient reported anterior ankle soreness with simple hops. It was at this time he told me he already worked legs heavy today. He wondered out load if it was too much for one day. Prior to this we did mirror training for gait, single leg calf raise testing, and single leg calf raises on a step without incident.   The remainder of the session focused on best practices relating to loading. Currently at the gym he is performing 3 sets of 15-20 reps. I recommended he choose a weight where he becomes tired at 10 reps. I also gave him a goal of being tired at 6 reps (4 sets of 6) in 6 weeks (mid-October). I also recommended doing legs only 2 days a week and choosing to do legs at PT on one of those days so we can properly load him up in rehab. Patient verbalized understanding. Informed patient that the anterior ankle pain (which is located over the extensor digitorum longus) is due to overload and that his overloading of the leg is limiting his ability to progress to agility exercises. Patient verbalized understanding. P: Continue with rehab plan.   (Progression will be 2 foot on level surface > 1 foot on level surface > 2 foot on step > 1 foot on step.)  Wash Eaves, PT    Treatment Charges: Mins Units   Initial Evaluation     Re-Evaluation     Ther Exercise         TE 40 2   Manual Therapy     MT     Ther Activities        TA     Gait Training          GT 10 1   Neuro Re-education NR     Modalities     Non-Billable Service Time     Other     Total Time/Units 50 3

## 2019-10-08 ENCOUNTER — OFFICE VISIT (OUTPATIENT)
Dept: ORTHOPEDIC SURGERY | Age: 39
End: 2019-10-08
Payer: COMMERCIAL

## 2019-10-08 VITALS — BODY MASS INDEX: 30.54 KG/M2 | HEIGHT: 74 IN | WEIGHT: 238 LBS

## 2019-10-08 DIAGNOSIS — S86.012A RUPTURE OF LEFT ACHILLES TENDON, INITIAL ENCOUNTER: Primary | ICD-10-CM

## 2019-10-08 PROCEDURE — 99212 OFFICE O/P EST SF 10 MIN: CPT | Performed by: ORTHOPAEDIC SURGERY

## 2020-07-15 ENCOUNTER — OFFICE VISIT (OUTPATIENT)
Dept: CARDIOLOGY CLINIC | Age: 40
End: 2020-07-15
Payer: COMMERCIAL

## 2020-07-15 VITALS
HEIGHT: 74 IN | DIASTOLIC BLOOD PRESSURE: 80 MMHG | BODY MASS INDEX: 30.96 KG/M2 | SYSTOLIC BLOOD PRESSURE: 122 MMHG | WEIGHT: 241.2 LBS | HEART RATE: 84 BPM | RESPIRATION RATE: 16 BRPM

## 2020-07-15 PROCEDURE — 99243 OFF/OP CNSLTJ NEW/EST LOW 30: CPT | Performed by: INTERNAL MEDICINE

## 2020-07-15 NOTE — PROGRESS NOTES
[facility] Cardiology consult  Dr. Zhane Orellana      Reason for Consult: Cardiac Evaluation  Referring Physician: Az Wilson MD     CHIEF COMPLAINT:   Chief Complaint   Patient presents with    Hyperlipidemia     Consult per Dr Patrick Veronica for cardiac eval; he c/o chest burning on Rt side on and off, about 2 mos ago; s/p EGD in 5/20   [address] [place] [address] of 3/6/20 attached       HISTORY OF PRESENT ILLNESS:   42year old male with no significant past medical history is here for an evaluation. Right-sided chest pain, burning-like, no radiation, no significant alleviating or aggravating factors, mostly at rest, better with exertion, no associated symptoms, occasional palpitations when he is stressed out, no lightheadedness or dizziness, no pedal edema, no PND, no orthopnea, no syncope, no presyncopal episodes. Past Medical History:   Diagnosis Date    Anxiety     Arthritis of left acromioclavicular joint 4/7/2018    GERD (gastroesophageal reflux disease)     Hyperlipidemia          Past Surgical History:   Procedure Laterality Date    ACHILLES TENDON SURGERY Left 4/18/2019    LEFT ANKLE ACHILLES TENDON REPAIR ([address]) performed by Renetta Montiel DO at [address] Left 04/18/2019    achilles tendon repair    ENDOSCOPY, COLON, DIAGNOSTIC      dilatation esophagus    FOREARM SURGERY      left fracture         Current Outpatient Medications   Medication Sig Dispense Refill    Omega-3 Fatty Acids (FISH OIL OMEGA-3 PO) Take by mouth daily      LYSINE PO Take by mouth daily      Flaxseed, Linseed, (FLAXSEED OIL PO) Take by mouth daily      Ascorbic Acid (VITAMIN C PO) Take by mouth daily      VITAMIN D PO Take by mouth daily      pantoprazole (PROTONIX) 40 MG tablet Take 40 mg by mouth daily        No current facility-administered medications for this visit.           Allergies as of 07/15/2020 - Review Complete 07/15/2020   Allergen Reaction Noted    Pcn [penicillins]  09/08/2012 Social History     Socioeconomic History    Marital status: Single     Spouse name: Not on file    Number of children: Not on file    Years of education: Not on file    Highest education level: Not on file   Occupational History    Not on file   Social Needs    Financial resource strain: Not on file    Food insecurity     Worry: Not on file     Inability: Not on file    Transportation needs     Medical: Not on file     Non-medical: Not on file   Tobacco Use    Smoking status: Former Smoker     Packs/day: 0.75     Years: 6.00     Pack years: 4.50     Types: Cigarettes, Cigars     Last attempt to quit:      Years since quittin.5    Smokeless tobacco: Former User     Types: Chew    Tobacco comment: Smokes occ cigar presently   Substance and Sexual Activity    Alcohol use: Yes     Comment: social    Drug use: No    Sexual activity: Never   Lifestyle    Physical activity     Days per week: Not on file     Minutes per session: Not on file    Stress: Not on file   Relationships    Social connections     Talks on phone: Not on file     Gets together: Not on file     Attends Bahai service: Not on file     Active member of club or organization: Not on file     Attends meetings of clubs or organizations: Not on file     Relationship status: Not on file    Intimate partner violence     Fear of current or ex partner: Not on file     Emotionally abused: Not on file     Physically abused: Not on file     Forced sexual activity: Not on file   Other Topics Concern    Not on file   Social History Narrative    Not on file       Family History   Problem Relation Age of Onset    Heart Surgery Mother        REVIEW OF SYSTEMS:     CONSTITUTIONAL:  negative for  fevers, chills, sweats and fatigue  EYES:  negative for  double vision, blurred vision and blind spots  HEENT:  negative for  tinnitus, earaches, nasal congestion and epistaxis  RESPIRATORY:  negative for  dry cough, cough with sputum, focal neurologic deficit was appreciated  SKIN:  no bruising or bleeding, normal skin color, texture, turgor and no redness, warmth, or swelling        /80 (Site: Right Upper Arm, Position: Sitting, Cuff Size: Large Adult)   Pulse 84   Resp 16   Ht 6' 2\" (1.88 m)   Wt 241 lb 3.2 oz (109.4 kg)   BMI 30.97 kg/m²     DATA:   I personally reviewed the visit EKG with the following interpretation: Sinus rhythm, left axis deviation  ECHO: Not performed to date  Stress Test: Not performed to date  Angiography: Not performed to date  Cardiology Labs: BMP:  No results found for: NA, K, CL, CO2, BUN, CREATININE  CMP:  No results found for: NA, K, CL, CO2, BUN, CREATININE, PROT, ALB  CBC:  No results found for: WBC, RBC, HGB, HCT, MCV, RDW, PLT  PT/INR:  No results found for: PTINR  PT/INR Warfarin:  No components found for: PTPATWAR, PTINRWAR  PTT:  No results found for: APTT  PTT Heparin:  No components found for: APTTHEP  Magnesium:  No results found for: MG  TSH:  No results found for: TSH  TROPONIN:  No components found for: TROP  BNP:  No results found for: BNP  FASTING LIPID PANEL:  No results found for: CHOL, HDL, TRIG  No orders to display     I have personally reviewed the laboratory, cardiac diagnostic and radiographic testing as outlined above:      IMPRESSION:  1. Chest pain: Noncardiac, patient was reassured  2. Hyperlipidemia    RECOMMENDATIONS:   1. Patient was reassured  2. If he continues to have chest pain he might benefit from CT of the chest to rule out noncardiac causes of chest pain  3. Follow-up with Dr. Chantal Cabrera as scheduled  4. Follow-up with Dr. Collins Overall as needed    I have reviewed my findings and recommendations with patient    Thank you for the consult  Electronically signed by Idania Villanueva MD on 7/15/2020 at 6:07 PM      NOTE: This report was transcribed using voice recognition software.  Every effort was made to ensure accuracy; however, inadvertent computerized transcription errors may be present

## 2020-07-19 PROCEDURE — 93000 ELECTROCARDIOGRAM COMPLETE: CPT | Performed by: INTERNAL MEDICINE

## 2020-08-03 ENCOUNTER — HOSPITAL ENCOUNTER (OUTPATIENT)
Age: 40
Discharge: HOME | End: 2020-08-05
Payer: COMMERCIAL

## 2020-08-03 PROCEDURE — 88305 TISSUE EXAM BY PATHOLOGIST: CPT

## 2020-11-15 LAB
SARS-COV-2: NOT DETECTED
SOURCE: NORMAL

## 2022-02-07 ENCOUNTER — HOSPITAL ENCOUNTER (EMERGENCY)
Age: 42
Discharge: HOME | End: 2022-02-07
Attending: EMERGENCY MEDICINE
Payer: COMMERCIAL

## 2022-02-07 ENCOUNTER — APPOINTMENT (OUTPATIENT)
Dept: CT IMAGING | Age: 42
End: 2022-02-07
Payer: COMMERCIAL

## 2022-02-07 VITALS
TEMPERATURE: 97.5 F | BODY MASS INDEX: 27.59 KG/M2 | SYSTOLIC BLOOD PRESSURE: 171 MMHG | RESPIRATION RATE: 18 BRPM | DIASTOLIC BLOOD PRESSURE: 106 MMHG | HEIGHT: 74 IN | WEIGHT: 215 LBS | OXYGEN SATURATION: 97 % | HEART RATE: 63 BPM

## 2022-02-07 DIAGNOSIS — N20.1 LEFT URETERAL STONE: Primary | ICD-10-CM

## 2022-02-07 LAB
ALBUMIN SERPL-MCNC: 4.4 G/DL (ref 3.5–5.2)
ALP BLD-CCNC: 72 U/L (ref 40–129)
ALT SERPL-CCNC: 21 U/L (ref 0–40)
ANION GAP SERPL CALCULATED.3IONS-SCNC: 10 MMOL/L (ref 7–16)
AST SERPL-CCNC: 23 U/L (ref 0–39)
BACTERIA: ABNORMAL /HPF
BASOPHILS ABSOLUTE: 0.05 E9/L (ref 0–0.2)
BASOPHILS RELATIVE PERCENT: 0.7 % (ref 0–2)
BILIRUB SERPL-MCNC: 0.4 MG/DL (ref 0–1.2)
BILIRUBIN URINE: NEGATIVE
BLOOD, URINE: ABNORMAL
BUN BLDV-MCNC: 24 MG/DL (ref 6–20)
CALCIUM SERPL-MCNC: 9.8 MG/DL (ref 8.6–10.2)
CHLORIDE BLD-SCNC: 102 MMOL/L (ref 98–107)
CLARITY: CLEAR
CO2: 26 MMOL/L (ref 22–29)
COLOR: YELLOW
CREAT SERPL-MCNC: 1 MG/DL (ref 0.7–1.2)
EOSINOPHILS ABSOLUTE: 0.23 E9/L (ref 0.05–0.5)
EOSINOPHILS RELATIVE PERCENT: 3.2 % (ref 0–6)
GFR AFRICAN AMERICAN: >60
GFR NON-AFRICAN AMERICAN: >60 ML/MIN/1.73
GLUCOSE BLD-MCNC: 107 MG/DL (ref 74–99)
GLUCOSE URINE: NEGATIVE MG/DL
HCT VFR BLD CALC: 46.2 % (ref 37–54)
HEMOGLOBIN: 15.8 G/DL (ref 12.5–16.5)
IMMATURE GRANULOCYTES #: 0.03 E9/L
IMMATURE GRANULOCYTES %: 0.4 % (ref 0–5)
KETONES, URINE: NEGATIVE MG/DL
LEUKOCYTE ESTERASE, URINE: NEGATIVE
LYMPHOCYTES ABSOLUTE: 2.19 E9/L (ref 1.5–4)
LYMPHOCYTES RELATIVE PERCENT: 30.5 % (ref 20–42)
MCH RBC QN AUTO: 31.5 PG (ref 26–35)
MCHC RBC AUTO-ENTMCNC: 34.2 % (ref 32–34.5)
MCV RBC AUTO: 92.2 FL (ref 80–99.9)
MONOCYTES ABSOLUTE: 0.54 E9/L (ref 0.1–0.95)
MONOCYTES RELATIVE PERCENT: 7.5 % (ref 2–12)
NEUTROPHILS ABSOLUTE: 4.15 E9/L (ref 1.8–7.3)
NEUTROPHILS RELATIVE PERCENT: 57.7 % (ref 43–80)
NITRITE, URINE: NEGATIVE
PDW BLD-RTO: 11.4 FL (ref 11.5–15)
PH UA: 5.5 (ref 5–9)
PLATELET # BLD: 219 E9/L (ref 130–450)
PMV BLD AUTO: 9.8 FL (ref 7–12)
POTASSIUM REFLEX MAGNESIUM: 4.1 MMOL/L (ref 3.5–5)
PROTEIN UA: NEGATIVE MG/DL
RBC # BLD: 5.01 E12/L (ref 3.8–5.8)
RBC UA: ABNORMAL /HPF (ref 0–2)
SODIUM BLD-SCNC: 138 MMOL/L (ref 132–146)
SPECIFIC GRAVITY UA: >=1.03 (ref 1–1.03)
TOTAL PROTEIN: 7.1 G/DL (ref 6.4–8.3)
UROBILINOGEN, URINE: 0.2 E.U./DL
WBC # BLD: 7.2 E9/L (ref 4.5–11.5)
WBC UA: ABNORMAL /HPF (ref 0–5)

## 2022-02-07 PROCEDURE — 99283 EMERGENCY DEPT VISIT LOW MDM: CPT

## 2022-02-07 PROCEDURE — 74176 CT ABD & PELVIS W/O CONTRAST: CPT

## 2022-02-07 PROCEDURE — 81001 URINALYSIS AUTO W/SCOPE: CPT

## 2022-02-07 PROCEDURE — 6360000002 HC RX W HCPCS

## 2022-02-07 PROCEDURE — 96374 THER/PROPH/DIAG INJ IV PUSH: CPT

## 2022-02-07 PROCEDURE — 80053 COMPREHEN METABOLIC PANEL: CPT

## 2022-02-07 PROCEDURE — 85025 COMPLETE CBC W/AUTO DIFF WBC: CPT

## 2022-02-07 PROCEDURE — 2580000003 HC RX 258: Performed by: STUDENT IN AN ORGANIZED HEALTH CARE EDUCATION/TRAINING PROGRAM

## 2022-02-07 PROCEDURE — 36415 COLL VENOUS BLD VENIPUNCTURE: CPT

## 2022-02-07 RX ORDER — KETOROLAC TROMETHAMINE 30 MG/ML
INJECTION, SOLUTION INTRAMUSCULAR; INTRAVENOUS
Status: COMPLETED
Start: 2022-02-07 | End: 2022-02-07

## 2022-02-07 RX ORDER — TAMSULOSIN HYDROCHLORIDE 0.4 MG/1
0.4 CAPSULE ORAL DAILY
Qty: 14 CAPSULE | Refills: 0 | Status: SHIPPED | OUTPATIENT
Start: 2022-02-07 | End: 2022-02-21

## 2022-02-07 RX ORDER — 0.9 % SODIUM CHLORIDE 0.9 %
1000 INTRAVENOUS SOLUTION INTRAVENOUS ONCE
Status: COMPLETED | OUTPATIENT
Start: 2022-02-07 | End: 2022-02-07

## 2022-02-07 RX ORDER — KETOROLAC TROMETHAMINE 10 MG/1
10 TABLET, FILM COATED ORAL EVERY 6 HOURS PRN
Qty: 20 TABLET | Refills: 0 | Status: SHIPPED | OUTPATIENT
Start: 2022-02-07 | End: 2022-02-14

## 2022-02-07 RX ADMIN — KETOROLAC TROMETHAMINE: 30 INJECTION, SOLUTION INTRAMUSCULAR; INTRAVENOUS at 06:12

## 2022-02-07 RX ADMIN — SODIUM CHLORIDE 1000 ML: 9 INJECTION, SOLUTION INTRAVENOUS at 06:14

## 2022-02-07 ASSESSMENT — ENCOUNTER SYMPTOMS
RECTAL PAIN: 0
VOICE CHANGE: 0
WHEEZING: 0
TROUBLE SWALLOWING: 0
DIARRHEA: 0
VOMITING: 0
ABDOMINAL PAIN: 0
NAUSEA: 0
COLOR CHANGE: 0
COUGH: 0
BACK PAIN: 0
CONSTIPATION: 0
SHORTNESS OF BREATH: 0

## 2022-02-07 ASSESSMENT — PAIN SCALES - GENERAL
PAINLEVEL_OUTOF10: 8
PAINLEVEL_OUTOF10: 8

## 2022-02-07 NOTE — ED PROVIDER NOTES
[address]      Pt Name: Gene Parish  MRN: [[id number]]  [place] 1980  Date of evaluation: 2/7/2022      CHIEF COMPLAINT       Chief Complaint   Patient presents with    Flank Pain     Left sided         HPI  Gene Parish is a 46 y.o. male history of GERD presents with complaints of left flank pain. Patient states that for the last 2 days in her bowels left flank pain. Yesterday states that it woke him from sleep and was severe on the left lower quadrant of his flank. States that it went away after an hour. Today patient states that again it woke him up at 3:00 this morning with the left, sharp, stabbing, severe, left lower quadrant left pain that is nonradiating. No alleviating or exacerbating factors. Not take any medications or measures alleviate symptoms. Denies any history of renal or ureteral stones, fevers, chills, nausea, vomiting, dysuria, hematuria, diarrhea and constipation. Except as noted above the remainder of the review of systems was reviewed and negative. Review of Systems   Constitutional: Negative for appetite change, chills, diaphoresis, fatigue, fever and unexpected weight change. HENT: Negative for trouble swallowing and voice change. Eyes: Negative for visual disturbance. Respiratory: Negative for cough, shortness of breath and wheezing. Cardiovascular: Negative for chest pain. Gastrointestinal: Negative for abdominal pain, constipation, diarrhea, nausea, rectal pain and vomiting. Endocrine: Negative for polyphagia and polyuria. Genitourinary: Positive for flank pain. Negative for dysuria and frequency. Musculoskeletal: Negative for arthralgias and back pain. Skin: Negative for color change, pallor, rash and wound. Neurological: Negative for weakness and headaches. Hematological: Negative for adenopathy. Psychiatric/Behavioral: Negative for agitation.    All other systems reviewed and are negative. Physical Exam  Vitals and nursing note reviewed. Constitutional:       General: He is not in acute distress. Appearance: Normal appearance. He is not ill-appearing or diaphoretic. HENT:      Head: Normocephalic and atraumatic. Nose: Nose normal.   Eyes:      Extraocular Movements: Extraocular movements intact. Pupils: Pupils are equal, round, and reactive to light. Cardiovascular:      Rate and Rhythm: Normal rate and regular rhythm. Pulses: Normal pulses. Heart sounds: Normal heart sounds. Pulmonary:      Effort: Pulmonary effort is normal.      Breath sounds: Normal breath sounds. Abdominal:      General: Abdomen is flat. Bowel sounds are normal.      Palpations: Abdomen is soft. Tenderness: There is no abdominal tenderness. There is no right CVA tenderness, left CVA tenderness or rebound. Negative signs include Choi's sign, Rovsing's sign and McBurney's sign. Musculoskeletal:         General: Normal range of motion. Cervical back: Normal range of motion. Skin:     General: Skin is warm and dry. Capillary Refill: Capillary refill takes less than 2 seconds. Neurological:      General: No focal deficit present. Mental Status: He is alert and oriented to person, place, and time. Psychiatric:         Mood and Affect: Mood normal.          Procedures     MDM  Number of Diagnoses or Management Options  Left ureteral stone  Diagnosis management comments: 68-year-old male no pertinent past medical history presents with complaints of left flank pain. Vitals within normal limits. Physical exam patient is in no acute stress, speaking full sentences. He has no left quadrant pain with palpation. No tenderness at McBurney point. Negative Choi sign. Negative CVA tenderness bilaterally. Diagnostic labs and imaging interpreted reviewed. Patient has a left ureteral stone.   No signs of urinary tract infection on urinalysis. And patient's renal function is intact. Patient given Flomax and analgesic medication at discharge. He will follow up with his urologist and primary care physician tomorrow. .         Patient is awake alert, hemodynamic stable, afebrile and in no respiratory distress. Discussed with patient plan for close outpatient follow-up with the patient's PCP as well as return precautions and the patient understands and agrees to the plan. Patient was seen with attending.                   --------------------------------------------- PAST HISTORY ---------------------------------------------  Past Medical History:  has a past medical history of Anxiety, Arthritis of left acromioclavicular joint, GERD (gastroesophageal reflux disease), and Hyperlipidemia. Past Surgical History:  has a past surgical history that includes Forearm surgery; Endoscopy, colon, diagnostic; Ankle surgery (Left, 04/18/2019); and Achilles tendon surgery (Left, 4/18/2019). Social History:  reports that he quit smoking about 20 years ago. His smoking use included cigarettes and cigars. He has a 4.50 pack-year smoking history. He has quit using smokeless tobacco.  His smokeless tobacco use included chew. He reports current alcohol use. He reports that he does not use drugs. Family History: family history includes Heart Surgery in his mother. The patients home medications have been reviewed.     Allergies: Pcn [penicillins]    -------------------------------------------------- RESULTS -------------------------------------------------  Labs:  Results for orders placed or performed during the hospital encounter of 02/07/22   Urinalysis with Microscopic   Result Value Ref Range    Color, UA Yellow Straw/Yellow    Clarity, UA Clear Clear    Glucose, Ur Negative Negative mg/dL    Bilirubin Urine Negative Negative    Ketones, Urine Negative Negative mg/dL    Specific Gravity, UA >=1.030 1.005 - 1.030    Blood, Urine SMALL (A) Negative    pH, UA 5.5 5.0 - 9.0    Protein, UA Negative Negative mg/dL    Urobilinogen, Urine 0.2 <2.0 E.U./dL    Nitrite, Urine Negative Negative    Leukocyte Esterase, Urine Negative Negative    WBC, UA 0-1 0 - 5 /HPF    RBC, UA 2-5 0 - 2 /HPF    Bacteria, UA NONE SEEN None Seen /HPF   CBC auto differential   Result Value Ref Range    WBC 7.2 4.5 - 11.5 E9/L    RBC 5.01 3.80 - 5.80 E12/L    Hemoglobin 15.8 12.5 - 16.5 g/dL    Hematocrit 46.2 37.0 - 54.0 %    MCV 92.2 80.0 - 99.9 fL    MCH 31.5 26.0 - 35.0 pg    MCHC 34.2 32.0 - 34.5 %    RDW 11.4 (L) 11.5 - 15.0 fL    Platelets 290 716 - 729 E9/L    MPV 9.8 7.0 - 12.0 fL    Neutrophils % 57.7 43.0 - 80.0 %    Immature Granulocytes % 0.4 0.0 - 5.0 %    Lymphocytes % 30.5 20.0 - 42.0 %    Monocytes % 7.5 2.0 - 12.0 %    Eosinophils % 3.2 0.0 - 6.0 %    Basophils % 0.7 0.0 - 2.0 %    Neutrophils Absolute 4.15 1.80 - 7.30 E9/L    Immature Granulocytes # 0.03 E9/L    Lymphocytes Absolute 2.19 1.50 - 4.00 E9/L    Monocytes Absolute 0.54 0.10 - 0.95 E9/L    Eosinophils Absolute 0.23 0.05 - 0.50 E9/L    Basophils Absolute 0.05 0.00 - 0.20 E9/L   Comprehensive Metabolic Panel w/ Reflex to MG   Result Value Ref Range    Sodium 138 132 - 146 mmol/L    Potassium reflex Magnesium 4.1 3.5 - 5.0 mmol/L    Chloride 102 98 - 107 mmol/L    CO2 26 22 - 29 mmol/L    Anion Gap 10 7 - 16 mmol/L    Glucose 107 (H) 74 - 99 mg/dL    BUN 24 (H) 6 - 20 mg/dL    CREATININE 1.0 0.7 - 1.2 mg/dL    GFR Non-African American >60 >=60 mL/min/1.73    GFR African American >60     Calcium 9.8 8.6 - 10.2 mg/dL    Total Protein 7.1 6.4 - 8.3 g/dL    Albumin 4.4 3.5 - 5.2 g/dL    Total Bilirubin 0.4 0.0 - 1.2 mg/dL    Alkaline Phosphatase 72 40 - 129 U/L    ALT 21 0 - 40 U/L    AST 23 0 - 39 U/L       ECG:  Please refer to workup tab for EKG read    Radiology:  CT ABDOMEN PELVIS WO CONTRAST Additional Contrast? None   Final Result   1.  5 x 4 mm obstructing stone within the proximal left ureter at the L3   level with mild hydronephrosis and hydroureter proximal to the stone. 2.  No additional significant findings. RECOMMENDATIONS:   Unavailable             ------------------------- NURSING NOTES AND VITALS REVIEWED ---------------------------  Date / Time Roomed:  2/7/2022  5:42 AM  ED Bed Assignment:  09/09    The nursing notes within the ED encounter and vital signs as below have been reviewed. BP (!) 171/106   Pulse 63   Temp 97.5 °F (36.4 °C) (Infrared)   Resp 18   Ht 6' 2\" (1.88 m)   Wt 215 lb (97.5 kg)   SpO2 97%   BMI 27.60 kg/m²   Oxygen Saturation Interpretation: normal      ------------------------------------------ PROGRESS NOTES ------------------------------------------    I have spoken with the patient and discussed todays results, in addition to providing specific details for the plan of care and counseling regarding the diagnosis and prognosis. Their questions are answered at this time and they are agreeable with the plan. I discussed at length with them reasons for immediate return here for re evaluation. They will followup with their PCP by calling their office tomorrow. --------------------------------- ADDITIONAL PROVIDER NOTES ---------------------------------  At this time the patient is without objective evidence of an acute process requiring hospitalization or inpatient management. They have remained hemodynamically stable throughout their entire ED visit and are stable for discharge with outpatient follow-up. The plan has been discussed in detail and they are aware of the specific conditions for emergent return, as well as the importance of follow-up.       Discharge Medication List as of 2/7/2022  6:49 AM      START taking these medications    Details   ketorolac (TORADOL) 10 MG tablet Take 1 tablet by mouth every 6 hours as needed for Pain This is the continuous of IV/IM medication that the patient tolerated in the emergency room, Disp-20 tablet, R-0Print tamsulosin (FLOMAX) 0.4 MG capsule Take 1 capsule by mouth daily for 14 days, Disp-14 capsule, R-0Print             Diagnosis:  1. Left ureteral stone        Disposition:  Patient's disposition: Discharge to home  Patient's condition is stable. Pb Samayoa MD  Resident  02/09/22 3782      ATTENDING PROVIDER ATTESTATION:     I have personally performed and/or participated in the history, exam, medical decision making, and procedures and agree with all pertinent clinical information. I have also reviewed and agree with the past medical, family and social history unless otherwise noted. I have discussed this patient in detail with the resident, and provided the instruction and education regarding flank pain and kidney stones. My findings/Plan: Heart RRR. Lungs CTA bilaterally. Abdomen soft. Nontender. Bowel sounds normal. Left CVA tenderness. Supportive care. Discharge for outpatient follow up.          Sasha Choi DO  03/13/22 6970

## 2022-02-07 NOTE — DISCHARGE INSTR - COC
Continuity of Care Form    Patient Name: Zach Fowler   :  1980  MRN:  [id number]    Admit date:  2022  Discharge date:  ***    Code Status Order: No Order   Advance Directives:      Admitting Physician:  No admitting provider for patient encounter.   PCP: Max Segundo MD    Discharging Nurse: [facility] Unit/Room#:   Discharging Unit Phone Number: ***    Emergency Contact:   Extended Emergency Contact Information  Primary Emergency Contact: [address] [place], [place] [address]gavino [facility] Phone: [phone]  Mobile Phone: [phone]  Relation: Other    Past Surgical History:  Past Surgical History:   Procedure Laterality Date    ACHILLES TENDON SURGERY Left 2019    LEFT ANKLE ACHILLES TENDON REPAIR ([address]) performed by Bhavna Singh DO at [address] [place] [address] Left 2019    achilles tendon repair    ENDOSCOPY, COLON, DIAGNOSTIC      dilatation esophagus    FOREARM SURGERY      left fracture       Immunization History:   Immunization History   Administered Date(s) Administered    Td, unspecified formulation 2012       Active Problems:  Patient Active Problem List   Diagnosis Code    Arthritis of left acromioclavicular joint M19.012    Achilles rupture, left S86.012A       Isolation/Infection:   Isolation            No Isolation          Patient Infection Status       None to display            Nurse Assessment:  Last Vital Signs: BP (!) 171/106   Pulse 63   Temp 97.5 °F (36.4 °C) (Infrared)   Resp 18   Ht 6' 2\" (1.88 m)   Wt 215 lb (97.5 kg)   SpO2 97%   BMI 27.60 kg/m²     Last documented pain score (0-10 scale): Pain Level: 8  Last Weight:   Wt Readings from Last 1 Encounters:   22 215 lb (97.5 kg)     Mental Status:  {IP PT MENTAL STATUS:95556}    IV Access:  [facility] IV ACCESS:833640022}    Nursing Mobility/ADLs:  Walking   {CHP DME YHVA:633926060}  Transfer  {P DME OKWE:729280634}  Bathing  {CHP DME QTJS:164168949}  Dressing  {CHP DME CQDQ:549474234}  Toileting  {CHP DME AIOT:012310994}  Feeding  {CHP DME ZNGS:932426166}  Med Admin  {CHP DME NPCU:477620884}  Med Delivery   { ISH MED Delivery:349646994}    Wound Care Documentation and Therapy:  Wound 12 Laceration Ear Left (Active)   Number of days: 2291        Elimination:  Continence: Bowel: {YES / SH:97905}  Bladder: {YES / SP:56774}  Urinary Catheter: {Urinary Catheter:706897008}   Colostomy/Ileostomy/Ileal Conduit: {YES / NC:47906}       Date of Last BM: ***  No intake or output data in the 24 hours ending 22 0641  No intake/output data recorded.     Safety Concerns:     [address] [employer] Safety Concerns:873275256}    Impairments/Disabilities:      [address] [employer] Impairments/Disabilities:030375388}    Nutrition Therapy:  Current Nutrition Therapy:   [address] [employer] Diet List:738436312}    Routes of Feeding: {[facility] DME Other Feedings:156406575}  Liquids: {Slp liquid thickness:38782}  Daily Fluid Restriction: {CHP DME Yes amt example:942768166}  Last Modified Barium Swallow with Video (Video Swallowing Test): {Done Not Done HCUI:123922013}    Treatments at the Time of Hospital Discharge:   Respiratory Treatments: ***  Oxygen Therapy:  {Therapy; copd oxygen:90042}  Ventilator:    { CC Vent YEVQ:306185393}    Rehab Therapies: {THERAPEUTIC INTERVENTION:5644254672}  Weight Bearing Status/Restrictions: [address] [employer] Weight Bearin}  Other Medical Equipment (for information only, NOT a DME order):  {EQUIPMENT:860718985}  Other Treatments: ***    Patient's personal belongings (please select all that are sent with patient):  {[facility] DME Belongings:898477296}    RN SIGNATURE:  {Esignature:687469254}    CASE MANAGEMENT/SOCIAL WORK SECTION    Inpatient Status Date: ***    Readmission Risk Assessment Score:  Readmission Risk              Risk of Unplanned Readmission:  0           Discharging to Facility/ Agency   Name:   Address:  Phone:  Fax:    Dialysis Facility (if applicable)   Name:  Address:  Dialysis Schedule:  Phone:  Fax:    / signature: {Esignature:762481855}    PHYSICIAN SECTION    Prognosis: {Prognosis:2786764476}    Condition at Discharge: Gerry Garvey Patient Condition:746994691}    Rehab Potential (if transferring to Rehab): {Prognosis:1400717966}    Recommended Labs or Other Treatments After Discharge: ***    Physician Certification: I certify the above information and transfer of Lorenzo Marsh  is necessary for the continuing treatment of the diagnosis listed and that he requires {Admit to Appropriate Level of Care:15726} for {GREATER/LESS:756048401} 30 days.      Update Admission H&P: {CHP DME Changes in TEWRI:624407167}    PHYSICIAN SIGNATURE:  {Esignature:043915292}

## 2022-02-11 ENCOUNTER — PREP FOR PROCEDURE (OUTPATIENT)
Dept: UROLOGY | Age: 42
End: 2022-02-11

## 2022-02-11 RX ORDER — SODIUM CHLORIDE 0.9 % (FLUSH) 0.9 %
5-40 SYRINGE (ML) INJECTION EVERY 12 HOURS SCHEDULED
Status: CANCELLED | OUTPATIENT
Start: 2022-02-11

## 2022-02-11 RX ORDER — SODIUM CHLORIDE 0.9 % (FLUSH) 0.9 %
5-40 SYRINGE (ML) INJECTION PRN
Status: CANCELLED | OUTPATIENT
Start: 2022-02-11

## 2022-02-11 RX ORDER — SODIUM CHLORIDE 9 MG/ML
INJECTION, SOLUTION INTRAVENOUS CONTINUOUS
Status: CANCELLED | OUTPATIENT
Start: 2022-02-11

## 2022-02-11 RX ORDER — SODIUM CHLORIDE 9 MG/ML
25 INJECTION, SOLUTION INTRAVENOUS PRN
Status: CANCELLED | OUTPATIENT
Start: 2022-02-11

## 2022-02-11 RX ORDER — CIPROFLOXACIN 2 MG/ML
400 INJECTION, SOLUTION INTRAVENOUS
Status: CANCELLED | OUTPATIENT
Start: 2022-02-11 | End: 2022-02-11

## 2022-02-14 ENCOUNTER — ANESTHESIA EVENT (OUTPATIENT)
Dept: OPERATING ROOM | Age: 42
End: 2022-02-14
Payer: COMMERCIAL

## 2022-02-14 NOTE — PROGRESS NOTES
[facility] PRE-ADMISSION TESTING INSTRUCTIONS    The Preadmission Testing patient is instructed accordingly using the following criteria (check applicable):    ARRIVAL INSTRUCTIONS:  [x] Parking the day of Surgery is located in the Main Entrance lot. Upon entering the door, make an immediate right to the surgery reception desk    [x] Bring photo ID and insurance card    [] Bring in a copy of Living will or Durable Power of  papers. [x] Please be sure to arrange for responsible adult to provide transportation to and from the hospital    [x] Please arrange for responsible adult to be with you for the 24 hour period post procedure due to having anesthesia      GENERAL INSTRUCTIONS:    [x] Nothing by mouth after midnight, including gum, candy, mints or water    [x] You may brush your teeth, but do not swallow any water    [x] Take medications as instructed with 1-2 oz of water    [x] Stop herbal supplements and vitamins 5 days prior to procedure    [] Follow preop dosing of blood thinners per physician instructions    [] Take 1/2 dose of evening insulin, but no insulin after midnight    [] No oral diabetic medications after midnight    [] If diabetic and have low blood sugar or feel symptomatic, take 1-2oz apple juice only    [] Bring inhalers day of surgery    [] Bring C-PAP/ Bi-Pap day of surgery    [] Bring urine specimen day of surgery    [x] Shower or bath with soap, lather and rinse well, AM of Surgery, no lotion, powders or creams \    [] Follow bowel prep as instructed per surgeon    [x] No tobacco products within 24 hours of surgery     [x] No alcohol or illegal drug use within 24 hours of surgery.     [x] Jewelry, body piercing's, eyeglasses, contact lenses and dentures are not permitted into surgery (bring cases)      [] Please do not wear any nail polish, make up or hair products on the day of surgery    [x] You can expect a call the business day prior to procedure to notify you if your arrival time changes    [x] If you receive a survey after surgery we would greatly appreciate your comments    [] Parent/guardian of a minor must accompany their child and remain on the premises  the entire time they are under our care     [] Pediatric patients may bring favorite toy, blanket or comfort item with them    [] A caregiver or family member must remain with the patient during their stay if they are mentally handicapped, have dementia, disoriented or unable to use a call light or would be a safety concern if left unattended    [x] Please notify surgeon if you develop any illness between now and time of surgery (cold, cough, sore throat, fever, nausea, vomiting) or any signs of infections  including skin, wounds, and dental.    [x]  The Outpatient Pharmacy is available to fill your prescription here on your day of surgery, ask your preop nurse for details    [x] Other instructions: wear loose, comfortable clothing  EDUCATIONAL MATERIALS PROVIDED:    [] PAT Preoperative Education Packet/Booklet     [] Medication List    [] Transfusion bracelet applied with instructions    [] Shower with soap, lather and rinse well, and use CHG wipes provided the evening before surgery as instructed    [] Incentive spirometer with instructions

## 2022-02-15 ENCOUNTER — APPOINTMENT (OUTPATIENT)
Dept: GENERAL RADIOLOGY | Age: 42
End: 2022-02-15
Attending: UROLOGY
Payer: COMMERCIAL

## 2022-02-15 ENCOUNTER — HOSPITAL ENCOUNTER (OUTPATIENT)
Age: 42
Setting detail: OUTPATIENT SURGERY
Discharge: HOME | End: 2022-02-15
Attending: UROLOGY | Admitting: UROLOGY
Payer: COMMERCIAL

## 2022-02-15 ENCOUNTER — ANESTHESIA (OUTPATIENT)
Dept: OPERATING ROOM | Age: 42
End: 2022-02-15
Payer: COMMERCIAL

## 2022-02-15 VITALS — DIASTOLIC BLOOD PRESSURE: 63 MMHG | OXYGEN SATURATION: 96 % | SYSTOLIC BLOOD PRESSURE: 104 MMHG

## 2022-02-15 VITALS
DIASTOLIC BLOOD PRESSURE: 72 MMHG | WEIGHT: 215 LBS | TEMPERATURE: 98.2 F | SYSTOLIC BLOOD PRESSURE: 107 MMHG | HEART RATE: 68 BPM | BODY MASS INDEX: 27.59 KG/M2 | HEIGHT: 74 IN | RESPIRATION RATE: 18 BRPM | OXYGEN SATURATION: 98 %

## 2022-02-15 PROCEDURE — 3700000001 HC ADD 15 MINUTES (ANESTHESIA): Performed by: UROLOGY

## 2022-02-15 PROCEDURE — 6360000002 HC RX W HCPCS: Performed by: NURSE ANESTHETIST, CERTIFIED REGISTERED

## 2022-02-15 PROCEDURE — 74420 UROGRAPHY RTRGR +-KUB: CPT

## 2022-02-15 PROCEDURE — 3600000003 HC SURGERY LEVEL 3 BASE: Performed by: UROLOGY

## 2022-02-15 PROCEDURE — C1758 CATHETER, URETERAL: HCPCS | Performed by: UROLOGY

## 2022-02-15 PROCEDURE — 6360000002 HC RX W HCPCS: Performed by: NURSE PRACTITIONER

## 2022-02-15 PROCEDURE — 7100000010 HC PHASE II RECOVERY - FIRST 15 MIN: Performed by: UROLOGY

## 2022-02-15 PROCEDURE — 3700000000 HC ANESTHESIA ATTENDED CARE: Performed by: UROLOGY

## 2022-02-15 PROCEDURE — 2580000003 HC RX 258: Performed by: NURSE ANESTHETIST, CERTIFIED REGISTERED

## 2022-02-15 PROCEDURE — 6360000004 HC RX CONTRAST MEDICATION: Performed by: UROLOGY

## 2022-02-15 PROCEDURE — 3600000013 HC SURGERY LEVEL 3 ADDTL 15MIN: Performed by: UROLOGY

## 2022-02-15 PROCEDURE — 7100000011 HC PHASE II RECOVERY - ADDTL 15 MIN: Performed by: UROLOGY

## 2022-02-15 PROCEDURE — 2709999900 HC NON-CHARGEABLE SUPPLY: Performed by: UROLOGY

## 2022-02-15 RX ORDER — SODIUM CHLORIDE 9 MG/ML
25 INJECTION, SOLUTION INTRAVENOUS PRN
Status: DISCONTINUED | OUTPATIENT
Start: 2022-02-15 | End: 2022-02-15 | Stop reason: HOSPADM

## 2022-02-15 RX ORDER — PHENAZOPYRIDINE HYDROCHLORIDE 100 MG/1
100 TABLET, FILM COATED ORAL ONCE
Status: DISCONTINUED | OUTPATIENT
Start: 2022-02-15 | End: 2022-02-15 | Stop reason: HOSPADM

## 2022-02-15 RX ORDER — SODIUM CHLORIDE 0.9 % (FLUSH) 0.9 %
5-40 SYRINGE (ML) INJECTION PRN
Status: DISCONTINUED | OUTPATIENT
Start: 2022-02-15 | End: 2022-02-15 | Stop reason: HOSPADM

## 2022-02-15 RX ORDER — MIDAZOLAM HYDROCHLORIDE 1 MG/ML
INJECTION INTRAMUSCULAR; INTRAVENOUS PRN
Status: DISCONTINUED | OUTPATIENT
Start: 2022-02-15 | End: 2022-02-15 | Stop reason: SDUPTHER

## 2022-02-15 RX ORDER — ONDANSETRON 2 MG/ML
INJECTION INTRAMUSCULAR; INTRAVENOUS PRN
Status: DISCONTINUED | OUTPATIENT
Start: 2022-02-15 | End: 2022-02-15 | Stop reason: SDUPTHER

## 2022-02-15 RX ORDER — SODIUM CHLORIDE 0.9 % (FLUSH) 0.9 %
5-40 SYRINGE (ML) INJECTION EVERY 12 HOURS SCHEDULED
Status: DISCONTINUED | OUTPATIENT
Start: 2022-02-15 | End: 2022-02-15 | Stop reason: HOSPADM

## 2022-02-15 RX ORDER — SODIUM CHLORIDE 9 MG/ML
INJECTION, SOLUTION INTRAVENOUS CONTINUOUS PRN
Status: DISCONTINUED | OUTPATIENT
Start: 2022-02-15 | End: 2022-02-15 | Stop reason: SDUPTHER

## 2022-02-15 RX ORDER — SODIUM CHLORIDE 9 MG/ML
INJECTION, SOLUTION INTRAVENOUS CONTINUOUS
Status: DISCONTINUED | OUTPATIENT
Start: 2022-02-15 | End: 2022-02-15 | Stop reason: HOSPADM

## 2022-02-15 RX ORDER — PROPOFOL 10 MG/ML
INJECTION, EMULSION INTRAVENOUS CONTINUOUS PRN
Status: DISCONTINUED | OUTPATIENT
Start: 2022-02-15 | End: 2022-02-15 | Stop reason: SDUPTHER

## 2022-02-15 RX ORDER — CIPROFLOXACIN 2 MG/ML
400 INJECTION, SOLUTION INTRAVENOUS
Status: COMPLETED | OUTPATIENT
Start: 2022-02-15 | End: 2022-02-15

## 2022-02-15 RX ORDER — FENTANYL CITRATE 50 UG/ML
INJECTION, SOLUTION INTRAMUSCULAR; INTRAVENOUS PRN
Status: DISCONTINUED | OUTPATIENT
Start: 2022-02-15 | End: 2022-02-15 | Stop reason: SDUPTHER

## 2022-02-15 RX ORDER — KETOROLAC TROMETHAMINE 30 MG/ML
INJECTION, SOLUTION INTRAMUSCULAR; INTRAVENOUS PRN
Status: DISCONTINUED | OUTPATIENT
Start: 2022-02-15 | End: 2022-02-15 | Stop reason: SDUPTHER

## 2022-02-15 RX ADMIN — KETOROLAC TROMETHAMINE 30 MG: 30 INJECTION, SOLUTION INTRAMUSCULAR; INTRAVENOUS at 08:16

## 2022-02-15 RX ADMIN — MIDAZOLAM 2 MG: 1 INJECTION INTRAMUSCULAR; INTRAVENOUS at 07:58

## 2022-02-15 RX ADMIN — PROPOFOL 150 MCG/KG/MIN: 10 INJECTION, EMULSION INTRAVENOUS at 08:03

## 2022-02-15 RX ADMIN — FENTANYL CITRATE 50 MCG: 50 INJECTION, SOLUTION INTRAMUSCULAR; INTRAVENOUS at 08:11

## 2022-02-15 RX ADMIN — ONDANSETRON 4 MG: 2 INJECTION INTRAMUSCULAR; INTRAVENOUS at 08:16

## 2022-02-15 RX ADMIN — SODIUM CHLORIDE: 9 INJECTION, SOLUTION INTRAVENOUS at 08:00

## 2022-02-15 RX ADMIN — CIPROFLOXACIN 400 MG: 2 INJECTION, SOLUTION INTRAVENOUS at 07:41

## 2022-02-15 RX ADMIN — FENTANYL CITRATE 50 MCG: 50 INJECTION, SOLUTION INTRAMUSCULAR; INTRAVENOUS at 08:03

## 2022-02-15 ASSESSMENT — PULMONARY FUNCTION TESTS
PIF_VALUE: 1
PIF_VALUE: 2
PIF_VALUE: 1
PIF_VALUE: 0
PIF_VALUE: 1
PIF_VALUE: 1
PIF_VALUE: 0
PIF_VALUE: 1

## 2022-02-15 NOTE — H&P
Cassy Reyes is a 46 y.o. male with a ureteral stone. Past Medical History:   Diagnosis Date    Anxiety     Arthritis of left acromioclavicular joint 2018    GERD (gastroesophageal reflux disease)     Hyperlipidemia     Left ureteral stone        Past Surgical History:   Procedure Laterality Date    ACHILLES TENDON SURGERY Left 2019    LEFT ANKLE ACHILLES TENDON REPAIR ([address]) performed by Mally Wetzel DO at [address] Left 2019    achilles tendon repair    ENDOSCOPY, COLON, DIAGNOSTIC      dilatation esophagus    FOREARM SURGERY      left fracture       Family History   Problem Relation Age of Onset    Heart Surgery Mother        Prior to Admission medications    Medication Sig Start Date End Date Taking?  Authorizing Provider   ketorolac (TORADOL) 10 MG tablet Take 1 tablet by mouth every 6 hours as needed for Pain This is the continuous of IV/IM medication that the patient tolerated in the emergency room 22 Yes Dami Lorenzo MD   tamsulosin [facility]) 0.4 MG capsule Take 1 capsule by mouth daily for 14 days 22 Yes Dami Lorenzo MD   LYSINE PO Take by mouth daily   Yes Historical Provider, MD   Ascorbic Acid (VITAMIN C PO) Take by mouth daily   Yes Historical Provider, MD   VITAMIN D PO Take by mouth daily   Yes Historical Provider, MD   pantoprazole (PROTONIX) 40 MG tablet Take 40 mg by mouth daily  19  Yes Historical Provider, MD        Allergies: Pcn [penicillins]    Social History     Tobacco Use    Smoking status: Former Smoker     Packs/day: 0.75     Years: 6.00     Pack years: 4.50     Types: Cigarettes, Cigars     Quit date:      Years since quittin.1    Smokeless tobacco: Former User     Types: Chew    Tobacco comment: Smokes occ cigar presently   Substance Use Topics    Alcohol use: Yes     Comment: social        Review of Systems:  Respiratory: negative for cough and hemoptysis  Cardiovascular: negative for chest pain and dyspnea  Gastrointestinal: negative for abdominal pain, diarrhea, nausea and vomiting  Genitourinary: as per HPI, otherwise negative. Derm: negative for rash and skin lesion(s)  Neurological: negative for seizures and tremors  Endocrine: negative for diabetic symptoms including polydipsia and polyuria  All other systems negative    Physical Exam:  There were no vitals filed for this visit. Skin:  Warm and dry. No rash or bruises  HEENT:  PERRLA, EOMI  Neck:  No JVD, No thyromegaly  Cardiac:  RRR  Lungs:  No audible wheezes, symmetric respirations, non-labored  Abdomen: Soft, non-tender, non-distended  Extremities:  No clubbing, edema or cyanosis  Neurological:  Moves all extremities, normal DTR    Lab Results   Component Value Date    WBC 7.2 02/07/2022    HGB 15.8 02/07/2022    HCT 46.2 02/07/2022    MCV 92.2 02/07/2022     02/07/2022       Lab Results   Component Value Date    CREATININE 1.0 02/07/2022       No results found for: PSA    No results for input(s): LABURIN in the last 72 hours. No results for input(s): BC in the last 72 hours. No results for input(s): [place] in the last 72 hours. Assessment and Plan:  1. To OR for Cystoscopy, Retrograde Pyelograms, Ureteroscopy, Laser Lithotripsy, Stone Extraction, Stent Placement. This document is being submitted long after the face to face encounter with the patient and for either coding or billing compliance. This is made with the most accuracy possible but details may be missing or potentially inaccurate due to limitations in timing, patient availability at the time of the note creation.   When applicable see paper chart for office note / H& P.

## 2022-02-15 NOTE — ANESTHESIA PRE PROCEDURE
Department of Anesthesiology  Preprocedure Note       Name:  Paola Galvez   Age:  46 y.o.  :  1980                                          MRN:  [id number]         Date:  2/15/2022      Surgeon: Corinthia Goodpasture):  Arturo Terry MD    Procedure: Procedure(s):  CYSTOSCOPY RETROGRADE PYELOGRAM URETEROSOCPY J STENT LASER LITHOTRIPSY LEFT    Medications prior to admission:   Prior to Admission medications    Medication Sig Start Date End Date Taking? Authorizing Provider   ketorolac (TORADOL) 10 MG tablet Take 1 tablet by mouth every 6 hours as needed for Pain This is the continuous of IV/IM medication that the patient tolerated in the emergency room 22 Yes Kezia Eason MD   tamsulosin (FLOMAX) 0.4 MG capsule Take 1 capsule by mouth daily for 14 days 22 Yes Kezia Eason MD   LYSINE PO Take by mouth daily   Yes Historical Provider, MD   Ascorbic Acid (VITAMIN C PO) Take by mouth daily   Yes Historical Provider, MD   VITAMIN D PO Take by mouth daily   Yes Historical Provider, MD   pantoprazole (PROTONIX) 40 MG tablet Take 40 mg by mouth daily  19  Yes Historical Provider, MD       Current medications:    No current facility-administered medications for this encounter. Allergies:     Allergies   Allergen Reactions    Pcn [Penicillins]      Not sure reaction       Problem List:    Patient Active Problem List   Diagnosis Code    Arthritis of left acromioclavicular joint M19.012    Achilles rupture, left S86.012A       Past Medical History:        Diagnosis Date    Anxiety     Arthritis of left acromioclavicular joint 2018    GERD (gastroesophageal reflux disease)     Hyperlipidemia     Left ureteral stone        Past Surgical History:        Procedure Laterality Date    ACHILLES TENDON SURGERY Left 2019    LEFT ANKLE ACHILLES TENDON REPAIR ([address]) performed by Eduardo Soriano DO at [address] Left 2019    achilles tendon repair    ENDOSCOPY, COLON, DIAGNOSTIC      dilatation esophagus    FOREARM SURGERY      left fracture       Social History:    Social History     Tobacco Use    Smoking status: Former Smoker     Packs/day: 0.75     Years: 6.00     Pack years: 4.50     Types: Cigarettes, Cigars     Quit date:      Years since quittin.    Smokeless tobacco: Former User     Types: Chew    Tobacco comment: Smokes occ cigar presently   Substance Use Topics    Alcohol use: Yes     Comment: social                                Counseling given: Not Answered  Comment: Smokes occ cigar presently      Vital Signs (Current):   Vitals:    22 0956   Weight: 215 lb (97.5 kg)   Height: 6' 2\" (1.88 m)                                              BP Readings from Last 3 Encounters:   22 (!) 171/106   07/15/20 122/80   19 (!) 108/56       NPO Status:                                                                                 BMI:   Wt Readings from Last 3 Encounters:   22 215 lb (97.5 kg)   22 215 lb (97.5 kg)   07/15/20 241 lb 3.2 oz (109.4 kg)     Body mass index is 27.6 kg/m². CBC:   Lab Results   Component Value Date    WBC 7.2 2022    RBC 5.01 2022    HGB 15.8 2022    HCT 46.2 2022    MCV 92.2 2022    RDW 11.4 2022     2022       CMP:   Lab Results   Component Value Date     2022    K 4.1 2022     2022    CO2 26 2022    BUN 24 2022    CREATININE 1.0 2022    GFRAA >60 2022    LABGLOM >60 2022    GLUCOSE 107 2022    PROT 7.1 2022    CALCIUM 9.8 2022    BILITOT 0.4 2022    ALKPHOS 72 2022    AST 23 2022    ALT 21 2022       POC Tests: No results for input(s): POCGLU, POCNA, POCK, POCCL, POCBUN, POCHEMO, POCHCT in the last 72 hours.     Coags: No results found for: PROTIME, INR, APTT    HCG (If Applicable): No results found for: PREGTESTUR, PREGSERUM, HCG, HCGQUANT     ABGs: No results found for: PHART, PO2ART, OEE0WOM, QCR9KST, BEART, P7FWDZLM     Type & Screen (If Applicable):  No results found for: LABABO, LABRH    Drug/Infectious Status (If Applicable):  No results found for: HIV, HEPCAB    COVID-19 Screening (If Applicable):   Lab Results   Component Value Date    COVID19 Not Detected 03/29/2021           Anesthesia Evaluation  Patient summary reviewed no history of anesthetic complications:   Airway: Mallampati: II  TM distance: >3 FB   Neck ROM: full  Mouth opening: > = 3 FB Dental: normal exam         Pulmonary: breath sounds clear to auscultation                            ROS comment: Former smoker   Cardiovascular:    (+) hyperlipidemia    (-) past MI and CAD    ECG reviewed  Rhythm: regular  Rate: normal                    Neuro/Psych:   Negative Neuro/Psych ROS              GI/Hepatic/Renal:   (+) GERD: well controlled, renal disease: kidney stones,           Endo/Other:    (+) : arthritis: OA., .    (-) diabetes mellitus, hypothyroidism               Abdominal:         (-) obese       Vascular: negative vascular ROS. Other Findings:             Anesthesia Plan      MAC     ASA 2       Induction: intravenous. MIPS: Prophylactic antiemetics administered. Anesthetic plan and risks discussed with patient. Plan discussed with CRNA.                   Estelle Velasquez MD   2/15/2022

## 2022-02-15 NOTE — ANESTHESIA POSTPROCEDURE EVALUATION
Department of Anesthesiology  Postprocedure Note    Patient: Hannah Mills  MRN: [de-identified]  YOB: 1980  Date of evaluation: 2/15/2022  Time:  11:41 AM     Procedure Summary     Date: 02/15/22 Room / Location: [address]    Anesthesia Start: 0800 Anesthesia Stop: 9209    Procedure: CYSTOSCOPY RETROGRADE PYELOGRAM (Left ) Diagnosis: (URETERAL CALCULUS)    Surgeons: Kyree Mon MD Responsible Provider: Tip Weiss MD    Anesthesia Type: MAC ASA Status: 2          Anesthesia Type: MAC    Waylon Phase I: Waylon Score: 10    Waylon Phase II: Waylon Score: 10    Last vitals: Reviewed and per EMR flowsheets.        Anesthesia Post Evaluation    Patient location during evaluation: PACU  Patient participation: complete - patient participated  Level of consciousness: awake and alert  Airway patency: patent  Nausea & Vomiting: no vomiting and no nausea  Complications: no  Cardiovascular status: hemodynamically stable  Respiratory status: acceptable  Hydration status: stable

## 2022-02-15 NOTE — OP NOTE
Operative Note      Patient: Jerica Sloan  YOB: 1980  MRN: [id number]    Date of Procedure: 2/15/2022    Pre-Op Diagnosis: URETERAL CALCULUS    Post-Op Diagnosis: Same, passed       Procedure(s):  CYSTOSCOPY RETROGRADE PYELOGRAM    Surgeon(s):  Sujata Frederick MD    Assistant:   * No surgical staff found *    Anesthesia: Monitor Anesthesia Care    Estimated Blood Loss (mL): Minimal    Complications: None    Specimens:   * No specimens in log *    Implants:  * No implants in log *      Drains: * No LDAs found *        INDICATION FOR PROCEDURE: Jerica Sloan is a 46 y.o.  male who presents for cystoscopy, retrograde pyelograms. He understands the risks, benefits, and alternatives of the procedure, signed informed consent, and agreed to proceed. PROCEDURE: The patient was brought into the operating room and placed under anesthesia in the dorsal lithotomy position. He was prepped and draped in a sterile fashion. A 21-Malian cystoscope with a 30-degree lens was passed through the urethra and into the bladder. The entire length of the urethra was examined and found to be without strictures or other abnormalities. The prostate was examined and found to be have no hyperplasia. The entire bladder mucosal surface was examined under 30- degree endoscopy and found to be without calculi, tumors, diverticula. The ureteral orifices were normal in size, number, and location. The entire cystoscopic exam was within normal limits. A 5-Malian open-ended catheter was passed into the left ureteral orifice and 10 mL of Cysto-Conray were injected under fluoroscopic guidance. The entire length of the ureter, the UPJ, renal pelvis, and calices were all within normal limits. The axis of the kidney was normal and there were no filling defects throughout the entire system.  The same procedure was repeated in the same manner on the right side, and the findings were the same including normal ureter, UPJ, renal pelvis, calices, and orientation of the kidney. Pyelogram on the left side was repeated to assess for potential stone and to confirm passage. Dynamic fluoroscopy was performed and showed no evidence of obstruction. There was excellent drainage of contrast.  There is no stone on the left side. The bladder was drained. The patient was awakened from anesthesia and taken to recovery in stable condition.     Cayla Barlow MD, M.D.  2/15/2022  8:27 AM        Electronically signed by Cayla Barlow MD on 2/15/2022 at 8:27 AM

## 2022-04-05 ENCOUNTER — OFFICE VISIT (OUTPATIENT)
Dept: FAMILY MEDICINE CLINIC | Age: 42
End: 2022-04-05
Payer: COMMERCIAL

## 2022-04-05 VITALS
HEART RATE: 79 BPM | WEIGHT: 229 LBS | HEIGHT: 74 IN | BODY MASS INDEX: 29.39 KG/M2 | TEMPERATURE: 96.8 F | SYSTOLIC BLOOD PRESSURE: 118 MMHG | OXYGEN SATURATION: 98 % | DIASTOLIC BLOOD PRESSURE: 84 MMHG

## 2022-04-05 DIAGNOSIS — R10.9 LEFT FLANK PAIN: ICD-10-CM

## 2022-04-05 DIAGNOSIS — R10.9 LEFT FLANK PAIN: Primary | ICD-10-CM

## 2022-04-05 LAB
BILIRUBIN, POC: NORMAL
BLOOD URINE, POC: NORMAL
CLARITY, POC: CLEAR
COLOR, POC: YELLOW
GLUCOSE URINE, POC: NORMAL
KETONES, POC: 15
LEUKOCYTE EST, POC: NORMAL
NITRITE, POC: NORMAL
PH, POC: 5.5
PROTEIN, POC: NORMAL
SPECIFIC GRAVITY, POC: 1.01
UROBILINOGEN, POC: 0.2

## 2022-04-05 PROCEDURE — 1036F TOBACCO NON-USER: CPT | Performed by: FAMILY MEDICINE

## 2022-04-05 PROCEDURE — 81002 URINALYSIS NONAUTO W/O SCOPE: CPT | Performed by: FAMILY MEDICINE

## 2022-04-05 PROCEDURE — 99214 OFFICE O/P EST MOD 30 MIN: CPT | Performed by: FAMILY MEDICINE

## 2022-04-05 PROCEDURE — G8427 DOCREV CUR MEDS BY ELIG CLIN: HCPCS | Performed by: FAMILY MEDICINE

## 2022-04-05 PROCEDURE — G8419 CALC BMI OUT NRM PARAM NOF/U: HCPCS | Performed by: FAMILY MEDICINE

## 2022-04-05 ASSESSMENT — ENCOUNTER SYMPTOMS
SHORTNESS OF BREATH: 0
BACK PAIN: 0
DIARRHEA: 0
EYE DISCHARGE: 0
COUGH: 0
VOMITING: 0
ABDOMINAL PAIN: 1
ALLERGIC/IMMUNOLOGIC NEGATIVE: 1
CHEST TIGHTNESS: 0
EYE PAIN: 0
TROUBLE SWALLOWING: 0
BLOOD IN STOOL: 0
NAUSEA: 0
EYE REDNESS: 0
PHOTOPHOBIA: 0
SINUS PAIN: 0
SORE THROAT: 0

## 2022-04-05 NOTE — PROGRESS NOTES
22  Basia Molina : 1980 Sex: male  Age: 46 y.o. Assessment and Plan:  Isaura Son was seen today for flank pain. Diagnoses and all orders for this visit:    Left flank pain  -     POCT Urinalysis no Micro  -     XR CHEST (2 VW); Future  -     Culture, Urine; Future    Alysis shows just a trace of blood, will check culture. Chest x-ray is clear there is no evidence of any rib fracture or pneumonia present. This feels more structural as he can duplicate the pain on range of motion and when lifting. He is to take 2 225 mg Aleve tablets morning and evening. He is to follow-up with his PCP in 2 to 3 days if not improving. If his complaints worsen in any way, to the emergency room immediately. Return 1 to 3-day recheck with PCP if not improved. .    Chief Complaint   Patient presents with    Flank Pain     left side, stone x 1 month afo       Complains of left upper quadrant abdominal discomfort. Is right below the margin of the ribs. 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 Ongoing for the last 2 to 3 days. Denies nausea, vomiting, diarrhea, constipation, anorexia, fever, chills, urinary symptoms. Tylenol and Aleve were not effective in relieving the discomfort. He did have a recent left renal calculi which he passed on his own. Review of Systems   Constitutional: Negative for appetite change, fatigue and unexpected weight change. HENT: Negative for congestion, ear pain, hearing loss, sinus pain, sore throat and trouble swallowing. Eyes: Negative for photophobia, pain, discharge and redness. Respiratory: Negative for cough, chest tightness and shortness of breath. Cardiovascular: Negative for chest pain, palpitations and leg swelling. Gastrointestinal: Positive for abdominal pain. Negative for blood in stool, diarrhea, nausea and vomiting. Endocrine: Negative.     Genitourinary: Negative for dysuria, flank pain, frequency and hematuria. Musculoskeletal: Negative for arthralgias, back pain, joint swelling and myalgias. Skin: Negative. Allergic/Immunologic: Negative. Neurological: Negative for dizziness, seizures, syncope, weakness, light-headedness, numbness and headaches. Hematological: Negative for adenopathy. Does not bruise/bleed easily. Psychiatric/Behavioral: Negative.           Current Outpatient Medications:     LYSINE PO, Take by mouth daily, Disp: , Rfl:     Ascorbic Acid (VITAMIN C PO), Take by mouth daily, Disp: , Rfl:     VITAMIN D PO, Take by mouth daily, Disp: , Rfl:     pantoprazole (PROTONIX) 40 MG tablet, Take 40 mg by mouth daily , Disp: , Rfl:     ketorolac (TORADOL) 10 MG tablet, Take 1 tablet by mouth every 6 hours as needed for Pain This is the continuous of IV/IM medication that the patient tolerated in the emergency room (Patient not taking: Reported on 4/5/2022), Disp: 20 tablet, Rfl: 0    tamsulosin (FLOMAX) 0.4 MG capsule, Take 1 capsule by mouth daily for 14 days (Patient not taking: Reported on 4/5/2022), Disp: 14 capsule, Rfl: 0  Allergies   Allergen Reactions    Pcn [Penicillins]      Not sure reaction       Past Medical History:   Diagnosis Date    Anxiety     Arthritis of left acromioclavicular joint 4/7/2018    GERD (gastroesophageal reflux disease)     Hyperlipidemia     Left ureteral stone      Past Surgical History:   Procedure Laterality Date    ACHILLES TENDON SURGERY Left 4/18/2019    LEFT ANKLE ACHILLES TENDON REPAIR ([address]) performed by Kvng Alonso DO at [address] Left 04/18/2019    achilles tendon repair    ENDOSCOPY, COLON, DIAGNOSTIC      dilatation esophagus    FOREARM SURGERY      left fracture    LITHOTRIPSY Left 2/15/2022    CYSTOSCOPY RETROGRADE PYELOGRAM performed by Cassandra Wilkinson MD at [facility]     Family History   Problem Relation Age of Onset    Heart Surgery Mother      Social History     Socioeconomic History    Marital status: Single     Spouse name: Not on file    Number of children: Not on file    Years of education: Not on file    Highest education level: Not on file   Occupational History    Not on file   Tobacco Use    Smoking status: Former Smoker     Packs/day: 0.75     Years: 6.00     Pack years: 4.50     Types: Cigarettes, Cigars     Quit date:      Years since quittin.2    Smokeless tobacco: Former User     Types: Chew    Tobacco comment: Smokes occ cigar presently   Vaping Use    Vaping Use: Never used   Substance and Sexual Activity    Alcohol use: Yes     Comment: social    Drug use: No    Sexual activity: Never   Other Topics Concern    Not on file   Social History Narrative    Not on file     Social Determinants of Health     Financial Resource Strain:     Difficulty of Paying Living Expenses: Not on file   Food Insecurity:     Worried About [address] in the Last Year: Not on file    [address] [employer] in the Last Year: Not on file   Transportation Needs:     Lack of Transportation (Medical): Not on file    Lack of Transportation (Non-Medical):  Not on file   Physical Activity:     Days of Exercise per Week: Not on file    Minutes of Exercise per Session: Not on file   Stress:     Feeling of Stress : Not on file   Social Connections:     Frequency of Communication with Friends and Family: Not on file    Frequency of Social Gatherings with Friends and Family: Not on file    Attends [employer] Services: Not on file    Active Member of Clubs or Organizations: Not on file    Attends Club or Organization Meetings: Not on file    Marital Status: Not on file   Intimate Partner Violence:     Fear of Current or Ex-Partner: Not on file    Emotionally Abused: Not on file    Physically Abused: Not on file    Sexually Abused: Not on file   Housing Stability:     Unable to Pay for Housing in the Last Year: Not on file    Number of [place] in the Last Year: Not on file  Unstable Housing in the Last Year: Not on file       Vitals:    04/05/22 1459   BP: 118/84   Pulse: 79   Temp: 96.8 °F (36 °C)   TempSrc: Temporal   SpO2: 98%   Weight: 229 lb (103.9 kg)   Height: 6' 2\" (1.88 m)       Physical Exam  Vitals and nursing note reviewed. Constitutional:       Appearance: He is well-developed. HENT:      Head: Atraumatic. Right Ear: External ear normal.      Left Ear: External ear normal.      Nose: Nose normal.      Mouth/Throat:      Pharynx: No oropharyngeal exudate. Eyes:      Conjunctiva/sclera: Conjunctivae normal.      Pupils: Pupils are equal, round, and reactive to light. Neck:      Thyroid: No thyromegaly. Trachea: No tracheal deviation. Cardiovascular:      Rate and Rhythm: Normal rate and regular rhythm. Heart sounds: No murmur heard. No friction rub. No gallop. Pulmonary:      Effort: Pulmonary effort is normal. No respiratory distress. Breath sounds: Normal breath sounds. Abdominal:      General: Abdomen is flat. Bowel sounds are normal.      Palpations: Abdomen is soft. Tenderness: There is no abdominal tenderness. There is no right CVA tenderness, left CVA tenderness, guarding or rebound. Musculoskeletal:         General: No tenderness or deformity. Normal range of motion. Cervical back: Normal range of motion and neck supple. Lymphadenopathy:      Cervical: No cervical adenopathy. Skin:     General: Skin is warm and dry. Capillary Refill: Capillary refill takes less than 2 seconds. Findings: No rash. Neurological:      Mental Status: He is alert and oriented to person, place, and time. Sensory: No sensory deficit. Motor: No abnormal muscle tone.       Coordination: Coordination normal.      Deep Tendon Reflexes: Reflexes normal.             Seen By:  Jovani Blankenship DO

## 2022-04-08 LAB — URINE CULTURE, ROUTINE: NORMAL

## 2022-08-08 ENCOUNTER — HOSPITAL ENCOUNTER (OUTPATIENT)
Age: 42
Discharge: HOME | End: 2022-08-10
Payer: COMMERCIAL

## 2022-08-08 ENCOUNTER — HOSPITAL ENCOUNTER (OUTPATIENT)
Dept: GENERAL RADIOLOGY | Age: 42
Discharge: HOME | End: 2022-08-10
Payer: COMMERCIAL

## 2022-08-08 DIAGNOSIS — S33.5XXA SPRAIN OF LOW BACK, INITIAL ENCOUNTER: ICD-10-CM

## 2022-08-08 PROCEDURE — 72110 X-RAY EXAM L-2 SPINE 4/>VWS: CPT

## 2022-09-07 NOTE — PROGRESS NOTES
[address] [facility]                                                                                                                     PRE OP INSTRUCTIONS FOR  Ramond Schaumann        Date: 9/7/2022    Date and time of surgery: 09/08/2022   Arrival Time:     Do not eat or drink anything after 12 midnight prior to surgery. This includes no water, chewing gum, mints or ice chips. Take the following pills with a small sip of water on the morning of Surgery: none     Diabetics may take evening dose of insulin but none after midnight. If you feel symptomatic or low blood sugar take 1-2 ounces of apple juice only. Aspirin, Ibuprofen, Advil, Naproxen, Vitamin E and other Anti-inflammatory products should be stopped  before surgery  as directed by your physician. Check with your Doctor regarding stopping Plavix, Coumadin, Lovenox, Fragmin or other blood thinners. Do not smoke,use illicit drugs and do not drink any alcoholic beverages 24 hours prior to surgery. You may brush your teeth and gargle the morning of surgery. DO NOT SWALLOW WATER    You MUST make arrangements for a responsible adult to take you home after your surgery. You will not be allowed to leave alone or drive yourself home. It is strongly suggested someone stay with you the first 24 hrs. Your surgery will be cancelled if you do not have a ride home. A parent/legal guardian must accompany a child scheduled for surgery and plan to stay at the hospital until the child is discharged. Please do not bring other children with you. Please wear simple, loose fitting clothing to the hospital.  oCng Chucky not bring valuables (money, credit cards, checkbooks, etc.) Do not wear any makeup (including no eye makeup) or nail polish on your fingers or toes. DO NOT wear any jewelry or piercings on day of surgery. All body piercing jewelry must be removed. Shower the night before surgery with ___Antibacterial soap ___Hibiclens.     Remember to bring Blood Bank bracelet to the hospital on the day of surgery. If you have a Living Will and Durable Power of  for Healthcare, please bring in a copy. If appropriate bring crutches, inspirex, etc... Notify your Surgeon if you develop any illness between now and surgery time, cough, cold, fever, sore throat, nausea, vomiting, etc.  Please notify your surgeon if you experience dizziness, shortness of breath or blurred vision between now & the time of your surgery. If you have ___dentures, they will be removed before going to the OR; we will provide you a container. If you wear ___contact lenses or ___glasses, they will be removed; please bring a case for them. To provide excellent care visitors will be limited to one in the room at any given time. Please bring picture ID and insurance card. Sleep apnea patients need to bring CPAP to hospital on day of surgery. Visit our web site for additional information: [email or web address] org/ho_sjhc. aspx    During flu season no children under the age of 15 are permitted in the hospital for the safety of all patients. Other                  Please call pre admission testing if you have any further questions.    [address]     [address]

## 2022-09-07 NOTE — H&P
[address]                              HISTORY AND PHYSICAL    PATIENT NAME: Bib Perla                      :        1980  MED REC NO:   [id number]                            ROOM:  ACCOUNT NO:   [de-identified]                           ADMIT DATE: 2022  PROVIDER:     Milderd Siemens, MD    CHIEF COMPLAINT:  Screening, dysphagia. HISTORY OF PRESENT ILLNESS:  The patient is a 42-year-old patient. Screening colonoscopy, having rectal bleeding and difficulty with  swallowing. History of eosinophilic esophagitis. ALLERGIES:  None. HABITS:  Denies. CURRENT MEDICATIONS:  Omeprazole. PAST MEDICAL HISTORY:  None. PAST SURGICAL HISTORY:  None. REVIEW OF SYSTEMS:  Noncontributory for this particular problem. FAMILY HISTORY:  Noncontributory for this particular problem. PHYSICAL EXAMINATION:  GENERAL:  Alert and oriented. VITAL SIGNS:  /80, pulse 72, respirations 15, and temperature  97.1. HEENT:  Oral cavity negative. NECK:  Negative. The peripheral lymph nodes are not palpable. CHEST:  Lungs clear to auscultation. CARDIOVASCULAR:  Heart sounds regular. ABDOMEN:  Soft. No palpable masses. RECTAL:  Negative. GENITALIA:  Deferred. EXTREMITIES:  Negative. NEUROLOGIC:  Oriented to time and space. No gross deficit. IMPRESSION:  1.  Screening colonoscopy, rectal bleeding. 2.  GERD, rule out eosinophilic esophagitis. PLAN:  Colonoscopy and EGD.         Rosanne Ruiz MD    D: 2022 7:30:02       T: 2022 8:45:14     ROSARIO/CHANA_MARIAH_BRENT  Job#: [id number]     Doc#: [id number]    CC:

## 2022-09-08 ENCOUNTER — ANESTHESIA (OUTPATIENT)
Dept: ENDOSCOPY | Age: 42
End: 2022-09-08
Payer: COMMERCIAL

## 2022-09-08 ENCOUNTER — HOSPITAL ENCOUNTER (OUTPATIENT)
Age: 42
Setting detail: OUTPATIENT SURGERY
Discharge: HOME | End: 2022-09-08
Attending: INTERNAL MEDICINE | Admitting: INTERNAL MEDICINE
Payer: COMMERCIAL

## 2022-09-08 ENCOUNTER — ANESTHESIA EVENT (OUTPATIENT)
Dept: ENDOSCOPY | Age: 42
End: 2022-09-08
Payer: COMMERCIAL

## 2022-09-08 VITALS
WEIGHT: 218 LBS | TEMPERATURE: 97.2 F | BODY MASS INDEX: 27.98 KG/M2 | HEIGHT: 74 IN | HEART RATE: 57 BPM | SYSTOLIC BLOOD PRESSURE: 114 MMHG | DIASTOLIC BLOOD PRESSURE: 57 MMHG | OXYGEN SATURATION: 98 % | RESPIRATION RATE: 16 BRPM

## 2022-09-08 DIAGNOSIS — K59.1 FUNCTIONAL DIARRHEA: ICD-10-CM

## 2022-09-08 DIAGNOSIS — K22.70 BARRETT'S ESOPHAGUS WITHOUT DYSPLASIA: ICD-10-CM

## 2022-09-08 PROCEDURE — 88305 TISSUE EXAM BY PATHOLOGIST: CPT | Performed by: ANESTHESIOLOGY

## 2022-09-08 PROCEDURE — 3609010300 HC COLONOSCOPY W/BIOPSY SINGLE/MULTIPLE: Performed by: INTERNAL MEDICINE

## 2022-09-08 PROCEDURE — 3609012400 HC EGD TRANSORAL BIOPSY SINGLE/MULTIPLE: Performed by: INTERNAL MEDICINE

## 2022-09-08 PROCEDURE — 3700000001 HC ADD 15 MINUTES (ANESTHESIA): Performed by: INTERNAL MEDICINE

## 2022-09-08 PROCEDURE — 2580000003 HC RX 258: Performed by: ANESTHESIOLOGY

## 2022-09-08 PROCEDURE — 7100000010 HC PHASE II RECOVERY - FIRST 15 MIN: Performed by: INTERNAL MEDICINE

## 2022-09-08 PROCEDURE — 2580000003 HC RX 258: Performed by: NURSE ANESTHETIST, CERTIFIED REGISTERED

## 2022-09-08 PROCEDURE — 6360000002 HC RX W HCPCS: Performed by: NURSE ANESTHETIST, CERTIFIED REGISTERED

## 2022-09-08 PROCEDURE — 88305 TISSUE EXAM BY PATHOLOGIST: CPT

## 2022-09-08 PROCEDURE — 7100000011 HC PHASE II RECOVERY - ADDTL 15 MIN: Performed by: INTERNAL MEDICINE

## 2022-09-08 PROCEDURE — 2709999900 HC NON-CHARGEABLE SUPPLY: Performed by: INTERNAL MEDICINE

## 2022-09-08 PROCEDURE — 3700000000 HC ANESTHESIA ATTENDED CARE: Performed by: INTERNAL MEDICINE

## 2022-09-08 RX ORDER — FENTANYL CITRATE 50 UG/ML
INJECTION, SOLUTION INTRAMUSCULAR; INTRAVENOUS PRN
Status: DISCONTINUED | OUTPATIENT
Start: 2022-09-08 | End: 2022-09-08 | Stop reason: SDUPTHER

## 2022-09-08 RX ORDER — PROPOFOL 10 MG/ML
INJECTION, EMULSION INTRAVENOUS PRN
Status: DISCONTINUED | OUTPATIENT
Start: 2022-09-08 | End: 2022-09-08 | Stop reason: SDUPTHER

## 2022-09-08 RX ORDER — SODIUM CHLORIDE, SODIUM LACTATE, POTASSIUM CHLORIDE, CALCIUM CHLORIDE 600; 310; 30; 20 MG/100ML; MG/100ML; MG/100ML; MG/100ML
INJECTION, SOLUTION INTRAVENOUS CONTINUOUS PRN
Status: DISCONTINUED | OUTPATIENT
Start: 2022-09-08 | End: 2022-09-08 | Stop reason: SDUPTHER

## 2022-09-08 RX ORDER — MIDAZOLAM HYDROCHLORIDE 1 MG/ML
INJECTION INTRAMUSCULAR; INTRAVENOUS PRN
Status: DISCONTINUED | OUTPATIENT
Start: 2022-09-08 | End: 2022-09-08 | Stop reason: SDUPTHER

## 2022-09-08 RX ORDER — SODIUM CHLORIDE, SODIUM LACTATE, POTASSIUM CHLORIDE, CALCIUM CHLORIDE 600; 310; 30; 20 MG/100ML; MG/100ML; MG/100ML; MG/100ML
INJECTION, SOLUTION INTRAVENOUS CONTINUOUS
Status: DISCONTINUED | OUTPATIENT
Start: 2022-09-08 | End: 2022-09-08 | Stop reason: HOSPADM

## 2022-09-08 RX ADMIN — SODIUM CHLORIDE, POTASSIUM CHLORIDE, SODIUM LACTATE AND CALCIUM CHLORIDE: 600; 310; 30; 20 INJECTION, SOLUTION INTRAVENOUS at 06:37

## 2022-09-08 RX ADMIN — SODIUM CHLORIDE, POTASSIUM CHLORIDE, SODIUM LACTATE AND CALCIUM CHLORIDE: 600; 310; 30; 20 INJECTION, SOLUTION INTRAVENOUS at 07:35

## 2022-09-08 RX ADMIN — PROPOFOL 100 MCG/KG/MIN: 10 INJECTION, EMULSION INTRAVENOUS at 07:48

## 2022-09-08 RX ADMIN — PROPOFOL 70 MG: 10 INJECTION, EMULSION INTRAVENOUS at 07:47

## 2022-09-08 RX ADMIN — MIDAZOLAM 2 MG: 1 INJECTION INTRAMUSCULAR; INTRAVENOUS at 07:43

## 2022-09-08 RX ADMIN — PROPOFOL 100 MCG/KG/MIN: 10 INJECTION, EMULSION INTRAVENOUS at 07:55

## 2022-09-08 RX ADMIN — FENTANYL CITRATE 50 MCG: 50 INJECTION, SOLUTION INTRAMUSCULAR; INTRAVENOUS at 07:47

## 2022-09-08 RX ADMIN — FENTANYL CITRATE 50 MCG: 50 INJECTION, SOLUTION INTRAMUSCULAR; INTRAVENOUS at 07:53

## 2022-09-08 ASSESSMENT — PAIN SCALES - GENERAL
PAINLEVEL_OUTOF10: 0
PAINLEVEL_OUTOF10: 0

## 2022-09-08 NOTE — H&P
H&p reviewed. No changes. Blood pressure 116/69, pulse 80, temperature 97.1 °F (36.2 °C), temperature source Infrared, resp. rate 16, height 6' 2\" (1.88 m), weight 218 lb (98.9 kg), SpO2 98 %.

## 2022-09-08 NOTE — ANESTHESIA PRE PROCEDURE
Department of Anesthesiology  Preprocedure Note       Name:  Yaritza Feldman   Age:  46 y.o.  :  1980                                          MRN:  [id number]         Date:  2022      Surgeon: Inge Hyman):  Gentry Martínez MD    Procedure: Procedure(s):  COLONOSCOPY DIAGNOSTIC  EGD ESOPHAGOGASTRODUODENOSCOPY    Medications prior to admission:   Prior to Admission medications    Medication Sig Start Date End Date Taking? Authorizing Provider   ketorolac (TORADOL) 10 MG tablet Take 1 tablet by mouth every 6 hours as needed for Pain This is the continuous of IV/IM medication that the patient tolerated in the emergency room  Patient not taking: Reported on 2022  Iain Juan MD   tamsulosin (FLOMAX) 0.4 MG capsule Take 1 capsule by mouth daily for 14 days  Patient not taking: Reported on 2022  Iain Juan MD   LYSINE PO Take by mouth daily    Historical Provider, MD   Ascorbic Acid (VITAMIN C PO) Take by mouth daily    Historical Provider, MD   VITAMIN D PO Take by mouth daily    Historical Provider, MD   pantoprazole (PROTONIX) 40 MG tablet Take 40 mg by mouth daily  19   Historical Provider, MD       Current medications:    No current facility-administered medications for this visit. No current outpatient medications on file. Facility-Administered Medications Ordered in Other Visits   Medication Dose Route Frequency Provider Last Rate Last Admin    lactated ringers infusion   IntraVENous Continuous Ml Canchola  mL/hr at 22 0637 New Bag at 22 8447       Allergies:     Allergies   Allergen Reactions    Pcn [Penicillins]      Not sure reaction       Problem List:    Patient Active Problem List   Diagnosis Code    Arthritis of left acromioclavicular joint M19.012    Achilles rupture, left R53.080Z       Past Medical History:        Diagnosis Date    Anxiety     Arthritis of left acromioclavicular joint 2018    GERD (gastroesophageal reflux disease)     Hyperlipidemia     Left ureteral stone        Past Surgical History:        Procedure Laterality Date    ACHILLES TENDON SURGERY Left 2019    LEFT ANKLE ACHILLES TENDON REPAIR ([address]) performed by Isabelle Wolfe DO at [address] Left 2019    achilles tendon repair    ENDOSCOPY, COLON, DIAGNOSTIC      dilatation esophagus    FOREARM SURGERY      left fracture    LITHOTRIPSY Left 2/15/2022    CYSTOSCOPY RETROGRADE PYELOGRAM performed by Grayling Goltz, MD at [address] [address] History:    Social History     Tobacco Use    Smoking status: Former     Packs/day: 0.75     Years: 6.00     Pack years: 4.50     Types: Cigarettes, Cigars     Quit date:      Years since quittin.6    Smokeless tobacco: Former     Types: Chew    Tobacco comments:     Smokes occ cigar presently   Substance Use Topics    Alcohol use: Yes     Comment: social                                Counseling given: Not Answered  Tobacco comments: Smokes occ cigar presently      Vital Signs (Current): There were no vitals filed for this visit.                                            BP Readings from Last 3 Encounters:   22 116/69   22 118/84   02/15/22 104/63       NPO Status:                                                                                 BMI:   Wt Readings from Last 3 Encounters:   22 218 lb (98.9 kg)   22 229 lb (103.9 kg)   22 215 lb (97.5 kg)     There is no height or weight on file to calculate BMI.    CBC:   Lab Results   Component Value Date/Time    WBC 7.2 2022 06:00 AM    RBC 5.01 2022 06:00 AM    HGB 15.8 2022 06:00 AM    HCT 46.2 2022 06:00 AM    MCV 92.2 2022 06:00 AM    RDW 11.4 2022 06:00 AM     2022 06:00 AM       CMP:   Lab Results   Component Value Date/Time     2022 06:00 AM    K 4.1 2022 06:00 AM     2022 06:00 AM    CO2 26 02/07/2022 06:00 AM    BUN 24 02/07/2022 06:00 AM    CREATININE 1.0 02/07/2022 06:00 AM    GFRAA >60 02/07/2022 06:00 AM    LABGLOM >60 02/07/2022 06:00 AM    GLUCOSE 107 02/07/2022 06:00 AM    PROT 7.1 02/07/2022 06:00 AM    CALCIUM 9.8 02/07/2022 06:00 AM    BILITOT 0.4 02/07/2022 06:00 AM    ALKPHOS 72 02/07/2022 06:00 AM    AST 23 02/07/2022 06:00 AM    ALT 21 02/07/2022 06:00 AM       POC Tests: No results for input(s): POCGLU, POCNA, POCK, POCCL, POCBUN, POCHEMO, POCHCT in the last 72 hours. Coags: No results found for: PROTIME, INR, APTT    HCG (If Applicable): No results found for: PREGTESTUR, PREGSERUM, HCG, HCGQUANT     ABGs: No results found for: PHART, PO2ART, SWU1YGD, PQY9XDY, BEART, K8EPMDKD     Type & Screen (If Applicable):  No results found for: LABABO, LABRH    Drug/Infectious Status (If Applicable):  No results found for: HIV, HEPCAB    COVID-19 Screening (If Applicable):   Lab Results   Component Value Date/Time    COVID19 Not Detected 03/29/2021 10:07 AM           Anesthesia Evaluation  Patient summary reviewed no history of anesthetic complications:   Airway: Mallampati: II  TM distance: >3 FB   Neck ROM: full  Mouth opening: > = 3 FB   Dental: normal exam         Pulmonary:Negative Pulmonary ROS breath sounds clear to auscultation                            ROS comment: Former smoker   Cardiovascular:    (+) hyperlipidemia    (-) past MI and CAD    ECG reviewed  Rhythm: regular  Rate: normal                    Neuro/Psych:   Negative Neuro/Psych ROS              GI/Hepatic/Renal:   (+) GERD: well controlled, renal disease: kidney stones, bowel prep,      (-) no morbid obesity       Endo/Other:    (+) : arthritis: OA., .    (-) diabetes mellitus, hypothyroidism               Abdominal:         (-) obese       Vascular: negative vascular ROS. Other Findings:             Anesthesia Plan      MAC     ASA 2       Induction: intravenous.       Anesthetic plan and risks discussed with patient. Plan discussed with CRNA. DOS STAFF ADDENDUM:    Pt seen and examined, chart reviewed (including anesthesia, drug and allergy history). Anesthetic plan, risks, benefits, alternatives, and personnel involved discussed with patient. Patient verbalized an understanding and agrees to proceed. Plan discussed with care team members and agreed upon.     Raleigh Cid MD  Staff Anesthesiologist  7:45 AM      Raleigh Cid MD   9/8/2022      Assessment Reviewed  RUBIN Carrizales - CRNA

## 2022-09-08 NOTE — OP NOTE
[address]                                OPERATIVE REPORT    PATIENT NAME: Chante Raman                      :        1980  MED REC NO:   [id number]                            ROOM:  ACCOUNT NO:   [de-identified]                           ADMIT DATE: 2022  PROVIDER:     Sasha Joseph MD    DATE OF PROCEDURE:  2022    SURGEON:  Sasha Joseph MD    PREOPERATIVE DIAGNOSES:  Screening colonoscopy, GERD. POSTOPERATIVE DIAGNOSES:  Anatomic colonoscopy, gastritis, abnormal  endoscopic mucosal pattern of the duodenum. OPERATIONS PERFORMED:  Colonoscopy, EGD. INDICATIONS:  The patient is a 79-year-old patient. Screening  colonoscopy, recurrent bouts of diarrhea. ASA classification III. Informed consent. DIGITAL RECTAL EXAM:  Anatomic. OPERATIVE PROCEDURE:  COLONOSCOPY:  The Olympus video colonoscope was introduced through the  anus, advanced gently until the terminal ileum. The prep was good. The  mucosa was normal.  Biopsy from terminal ileum. Biopsies from the  rectal and sigmoid. Endoscope withdrawn. ESTIMATED BLOOD LOSS:  None. EGD:  The Olympus upper video endoscope was introduced through the  mouth. The esophagus, the stomach, and proximal duodenum were  inspected. Exam of the gastric fundus by retroflexion. Erosions in the  mucosa of the gastric antrum. The mucosal pattern of the duodenum was  somewhat granular. Biopsies of lower and middle esophagus, gastric  antrum and duodenum. Endoscope withdrawn. ESTIMATED BLOOD LOSS:  None.         Marcela Beltran MD    D: 2022 8:18:14       T: 2022 8:52:29     ROSARIO/CHANA_MARIAH_BRENT  Job#: [id number]     Doc#: [id number]    CC:

## 2022-09-08 NOTE — DISCHARGE INSTRUCTIONS
Upper GI Endoscopy: What to Expect at [address] had an upper GI endoscopy. Your doctor used a thin, lighted tube that bends to look at the inside of your esophagus, your stomach, and the first part ofthe small intestine, called the duodenum. After you have an endoscopy, you will stay at the hospital or clinic for 1 to 2 hours. This will allow the medicine to wear off. You will be able to go home after your doctor or nurse checks to make sure that you're not having anyproblems. You may have to stay overnight if you had treatment during the test. You mayhave a sore throat for a day or two after the test.  This care sheet gives you a general idea about what to expect after the test.  How can you care for yourself at home? Activity   Rest as much as you need to after you go home. You should be able to go back to your usual activities the day after the test.  Diet   Follow your doctor's directions for eating after the test.  Drink plenty of fluids (unless your doctor has told you not to). Medications   If you have a sore throat the day after the test, use an over-the-counter spray to numb your throat. Follow-up care is a key part of your treatment and safety. Be sure to make and go to all appointments, and call your doctor if you are having problems. It's also a good idea to know your test results and keep alist of the medicines you take. When should you call for help? Call 911 anytime you think you may need emergency care. For example, call if:    You passed out (lost consciousness). You have trouble breathing. You pass maroon or bloody stools. Call your doctor now or seek immediate medical care if:    You have pain that does not get better after your take pain medicine. You have new or worse belly pain. You have blood in your stools. You are sick to your stomach and cannot keep fluids down. You have a fever. You cannot pass stools or gas.    Watch closely for colon, you'll do a \"colon prep\" before the test. This means you stop eating solid foods and drink only clear liquids. You can have water, tea, coffee, clear juices, clear broths, flavored ice pops, and gelatin (suchas Jell-O). Do not drink anything red or purple. The day or night before the procedure, you drink a large amount of a special liquid. This causes loose, frequent stools. You will go to the bathroom a lot. Your doctor may have you drink part of the liquid the evening before and the rest on the day of the test. It's very important to drink all of the liquid. Ifyou have problems drinking it, call your doctor. Arrange to have someone take you home after the test.  What can you expect after a colonoscopy? Your doctor will tell you when you can eat and do your usual activities. Drink a lot of fluid after the test to replace the fluids you may have lostduring the colon prep. But don't drink alcohol. Your doctor will talk to you about when you'll need your next colonoscopy. The results of your test and your risk for colorectal cancer will help your doctordecide how often you need to be checked. After the test, you may be bloated or have gas pains. You may need to pass gas. If a biopsy was done or a polyp was removed, you may have streaks of blood in your stool (feces) for a few days. Check with your doctor to see when it issafe to take aspirin and nonsteroidal anti-inflammatory drugs (NSAIDs) again. Problems such as heavy rectal bleeding may not occur until several weeks afterthe test. This isn't common. But it can happen after polyps are removed. Follow-up care is a key part of your treatment and safety. Be sure to make and go to all appointments, and call your doctor if you are having problems. It's also a good idea to know your test results and keep alist of the medicines you take. Where can you learn more? Go to https://brent.[employer]. org and sign in to your Patient Communicatort account.  Enter X880 in the [facility] box to learn more about \"Learning About Colonoscopy. \"     If you do not have an account, please click on the \"Sign Up Now\" link. Current as of: September 8, 2021               Content Version: 13.3  © 7276-4782 Healthwise, Incorporated. Care instructions adapted under license by [facility] ([facility]). If you have questions about a medical condition or this instruction, always ask your healthcare professional. [address] any warranty or liability for your use of this information.

## 2022-09-08 NOTE — ANESTHESIA POSTPROCEDURE EVALUATION
Department of Anesthesiology  Postprocedure Note    Patient: Marianne Fierro  MRN: [de-identified]  YOB: 1980  Date of evaluation: 9/8/2022      Procedure Summary     Date: 09/08/22 Room / Location: [address] [place] [address] / [facility]    Anesthesia Start: 6022 Anesthesia Stop: Michael Mooney    Procedures:       COLONOSCOPY WITH BIOPSY      EGD BIOPSY Diagnosis:       Montejo's esophagus without dysplasia      Functional diarrhea      (Montejo's esophagus without dysplasia [K22.70])      (Functional diarrhea [K59.1])    Surgeons: Lindy Guajardo MD Responsible Provider: Sabiha Chandra MD    Anesthesia Type: MAC ASA Status: 2          Anesthesia Type: No value filed.     Waylon Phase I:      Waylon Phase II:        Anesthesia Post Evaluation    Patient location during evaluation: PACU  Patient participation: complete - patient participated  Level of consciousness: awake and alert  Airway patency: patent  Nausea & Vomiting: no nausea and no vomiting  Complications: no  Cardiovascular status: hemodynamically stable  Respiratory status: acceptable  Hydration status: euvolemic

## 2023-08-10 NOTE — PROGRESS NOTES
[address] [facility]                                                                                                                    PRE OP INSTRUCTIONS FOR  Karen Pérez        Date: 8/10/2023    Date of surgery: 8-14-23   Arrival Time: Hospital will call you on 8-11-23 between 5pm and 7pm with your final arrival time for surgery    Do not eat or drink anything after midnight prior to surgery. This includes no water, chewing gum, mints or ice chips. Take the following medications with a small sip of water on the morning of Surgery: none     Diabetics may take evening dose of insulin but none after midnight. If you feel symptomatic or low blood sugar morning of surgery drink 1-2 ounces of apple juice only. Aspirin, Ibuprofen, Advil, Naproxen, Vitamin E and other Anti-inflammatory products should be stopped  before surgery  as directed by your physician. Take Tylenol only unless instructed otherwise by your surgeon. Check with your Doctor regarding stopping Plavix, Coumadin, Lovenox, Eliquis, Effient, or other blood thinners. Do not smoke,use illicit drugs and do not drink any alcoholic beverages 24 hours prior to surgery. You may brush your teeth the morning of surgery. DO NOT SWALLOW WATER    You MUST make arrangements for a responsible adult to take you home after your surgery. You will not be allowed to leave alone or drive yourself home. It is strongly suggested someone stay with you the first 24 hrs. Your surgery will be cancelled if you do not have a ride home. PEDIATRIC PATIENTS ONLY:  A parent/legal guardian must accompany a child scheduled for surgery and plan to stay at the hospital until the child is discharged. Please do not bring other children with you.     Please wear simple, loose fitting clothing to the hospital.  Yany Peers not bring valuables (money, credit cards, checkbooks, etc.) Do not wear any makeup (including no eye makeup) or nail polish on your fingers or

## 2023-08-12 ENCOUNTER — ANESTHESIA EVENT (OUTPATIENT)
Dept: ENDOSCOPY | Age: 43
End: 2023-08-12
Payer: COMMERCIAL

## 2023-08-14 ENCOUNTER — ANESTHESIA (OUTPATIENT)
Dept: ENDOSCOPY | Age: 43
End: 2023-08-14
Payer: COMMERCIAL

## 2023-08-14 ENCOUNTER — HOSPITAL ENCOUNTER (OUTPATIENT)
Age: 43
Setting detail: OUTPATIENT SURGERY
Discharge: HOME | End: 2023-08-14
Attending: INTERNAL MEDICINE | Admitting: INTERNAL MEDICINE
Payer: COMMERCIAL

## 2023-08-14 VITALS
RESPIRATION RATE: 18 BRPM | TEMPERATURE: 98 F | HEART RATE: 89 BPM | WEIGHT: 211 LBS | DIASTOLIC BLOOD PRESSURE: 72 MMHG | BODY MASS INDEX: 27.96 KG/M2 | HEIGHT: 73 IN | SYSTOLIC BLOOD PRESSURE: 112 MMHG | OXYGEN SATURATION: 98 %

## 2023-08-14 DIAGNOSIS — K20.0 EOSINOPHILIC ESOPHAGITIS: ICD-10-CM

## 2023-08-14 PROCEDURE — 2709999900 HC NON-CHARGEABLE SUPPLY: Performed by: INTERNAL MEDICINE

## 2023-08-14 PROCEDURE — 7100000011 HC PHASE II RECOVERY - ADDTL 15 MIN: Performed by: INTERNAL MEDICINE

## 2023-08-14 PROCEDURE — 6360000002 HC RX W HCPCS

## 2023-08-14 PROCEDURE — 3700000001 HC ADD 15 MINUTES (ANESTHESIA): Performed by: INTERNAL MEDICINE

## 2023-08-14 PROCEDURE — 2580000003 HC RX 258: Performed by: ANESTHESIOLOGY

## 2023-08-14 PROCEDURE — 3609012400 HC EGD TRANSORAL BIOPSY SINGLE/MULTIPLE: Performed by: INTERNAL MEDICINE

## 2023-08-14 PROCEDURE — 3700000000 HC ANESTHESIA ATTENDED CARE: Performed by: INTERNAL MEDICINE

## 2023-08-14 PROCEDURE — 7100000010 HC PHASE II RECOVERY - FIRST 15 MIN: Performed by: INTERNAL MEDICINE

## 2023-08-14 RX ORDER — SODIUM CHLORIDE, SODIUM LACTATE, POTASSIUM CHLORIDE, CALCIUM CHLORIDE 600; 310; 30; 20 MG/100ML; MG/100ML; MG/100ML; MG/100ML
INJECTION, SOLUTION INTRAVENOUS CONTINUOUS
Status: DISCONTINUED | OUTPATIENT
Start: 2023-08-14 | End: 2023-08-14 | Stop reason: HOSPADM

## 2023-08-14 RX ORDER — SODIUM CHLORIDE 9 MG/ML
INJECTION, SOLUTION INTRAVENOUS CONTINUOUS
Status: CANCELLED | OUTPATIENT
Start: 2023-08-14

## 2023-08-14 RX ORDER — PROPOFOL 10 MG/ML
INJECTION, EMULSION INTRAVENOUS PRN
Status: DISCONTINUED | OUTPATIENT
Start: 2023-08-14 | End: 2023-08-14 | Stop reason: SDUPTHER

## 2023-08-14 RX ORDER — MIDAZOLAM HYDROCHLORIDE 1 MG/ML
INJECTION INTRAMUSCULAR; INTRAVENOUS PRN
Status: DISCONTINUED | OUTPATIENT
Start: 2023-08-14 | End: 2023-08-14 | Stop reason: SDUPTHER

## 2023-08-14 RX ADMIN — MIDAZOLAM 2 MG: 1 INJECTION INTRAMUSCULAR; INTRAVENOUS at 15:54

## 2023-08-14 RX ADMIN — SODIUM CHLORIDE, POTASSIUM CHLORIDE, SODIUM LACTATE AND CALCIUM CHLORIDE: 600; 310; 30; 20 INJECTION, SOLUTION INTRAVENOUS at 15:01

## 2023-08-14 RX ADMIN — PROPOFOL 220 MG: 10 INJECTION, EMULSION INTRAVENOUS at 15:56

## 2023-08-14 ASSESSMENT — PAIN - FUNCTIONAL ASSESSMENT: PAIN_FUNCTIONAL_ASSESSMENT: 0-10

## 2023-08-14 NOTE — PROCEDURES
Procedure:  Esophagogastroduodenoscopy with Biopsy    Indication: EOE-assess response to aerosolized swallowed steroid for 3 months    Sedation  MAC    Endoscope was advanced easily through mouth to second portion of duodenum      Oropharynx views are limited but grossly normal.    Esophagus:   Mucosa is normal.  GEJ at 40 cm. Biopsies distal mid and proximal esophagus to    Stomach:   Antrum is normal    Gastric body is normal.    Retroflexed views show normal fundus and cardia. Duodenum: Bulb is normal.    Second portion of duodenum is normal.    EBL: Less than 1 cc    IMPRESSION AND PLAN:     1. Normal upper endoscopy. Z-line at 40 cm from the incisors    Recommendations: Await biopsy results. We will have him call for results and further recommendations. Observe off steroids for the time being. Follow up as outpatient in office, call [phone] to schedule for appointment.       Kym Waddell MD  8/14/2023  4:06 PM      [id number]

## 2023-08-14 NOTE — DISCHARGE INSTRUCTIONS
Upper GI Endoscopy: What to Expect at [address] [place] [address] [place] had an upper GI endoscopy. Your doctor used a thin, lighted tube that bends to look at the inside of your esophagus, your stomach, and the first part of the small intestine, called the duodenum. After you have an endoscopy, you will stay at the hospital or clinic for 1 to 2 hours. This will allow the medicine to wear off. You will be able to go home after your doctor or nurse checks to make sure that you're not having any problems. You may have to stay overnight if you had treatment during the test. You may have a sore throat for a day or two after the test.  This care sheet gives you a general idea about what to expect after the test.  How can you care for yourself at home? Activity   Rest as much as you need to after you go home. You should be able to go back to your usual activities the day after the test.  Diet   Follow your doctor's directions for eating after the test.  Drink plenty of fluids (unless your doctor has told you not to). Medications   If you have a sore throat the day after the test, use an over-the-counter spray to numb your throat. Follow-up care is a key part of your treatment and safety. Be sure to make and go to all appointments, and call your doctor if you are having problems. It's also a good idea to know your test results and keep a list of the medicines you take. When should you call for help? Call 911 anytime you think you may need emergency care. For example, call if:    You passed out (lost consciousness). You have trouble breathing. You pass maroon or bloody stools. Call your doctor now or seek immediate medical care if:    You have pain that does not get better after your take pain medicine. You have new or worse belly pain. You have blood in your stools. You are sick to your stomach and cannot keep fluids down. You have a fever. You cannot pass stools or gas.    Watch closely

## 2023-08-14 NOTE — H&P
GI H&P NOTE     [facility] Alwin Holstein, M.D., Dr. Yuri Waller M.D., Dr. John Cade D.O., Dr. Gloria Avila M.D., Dr. Star Logan D.O., Dr. Asencio               08/14/23  3:27 PM  Marlise Stormy HAWK Banko  38 y.o.  male       HPI:  39 yo CM with EOE who has been on swallowed aerosolized steroids now to assess response to therapy. EGD to be performed. PAST MEDICAL Hx:  Active Ambulatory Problems     Diagnosis Date Noted    Arthritis of left acromioclavicular joint 04/07/2018    Achilles rupture, left 04/18/2019     Resolved Ambulatory Problems     Diagnosis Date Noted    No Resolved Ambulatory Problems     Past Medical History:   Diagnosis Date    Anxiety     GERD (gastroesophageal reflux disease)     Left ureteral stone           PAST SURGICAL Hx:   Past Surgical History:   Procedure Laterality Date    ACHILLES TENDON SURGERY Left 4/18/2019    LEFT ANKLE ACHILLES TENDON REPAIR ([address]) performed by Robe Woods DO at [address] [place] [address] Left 04/18/2019    achilles tendon repair    COLONOSCOPY N/A 9/8/2022    COLONOSCOPY WITH BIOPSY performed by Kaylynn Medina MD at [facility], COLON, DIAGNOSTIC      dilatation esophagus    FOREARM SURGERY      left fracture    LITHOTRIPSY Left 2/15/2022    CYSTOSCOPY RETROGRADE PYELOGRAM performed by Felice Champion MD at [address] [facility] N/A 9/8/2022    EGD BIOPSY performed by Kaylynn Medina MD at [address] Hx:  Family History   Problem Relation Age of Onset    Heart Disease Mother     Heart Surgery Mother           HOME MEDICATIONS:  Prior to Admission medications    Medication Sig Start Date End Date Taking?  Authorizing Provider   LYSINE PO Take by mouth daily    Historical Provider, MD   Ascorbic Acid (VITAMIN C PO) Take by mouth daily    Historical Provider, MD   VITAMIN D PO Take by mouth daily    Historical Provider, MD   pantoprazole

## 2023-08-14 NOTE — ANESTHESIA POSTPROCEDURE EVALUATION
Department of Anesthesiology  Postprocedure Note    Patient: Anne Razo  MRN: [de-identified]  YOB: 1980  Date of evaluation: 8/14/2023      Procedure Summary     Date: 08/14/23 Room / Location: [address] [place] [address] 01 / [address]    Anesthesia Start: 3977 Anesthesia Stop:     Procedure: EGD BIOPSY Diagnosis:       Eosinophilic esophagitis      (Eosinophilic esophagitis [D63.9])    Surgeons: Neela Suarez MD Responsible Provider: Larry Odonnell MD    Anesthesia Type: MAC ASA Status: 2          Anesthesia Type: No value filed.     Waylon Phase I: Waylon Score: 10    Waylon Phase II:        Anesthesia Post Evaluation    Patient location during evaluation: PACU  Patient participation: complete - patient participated  Level of consciousness: awake  Airway patency: patent  Nausea & Vomiting: no nausea and no vomiting  Complications: no  Cardiovascular status: hemodynamically stable  Respiratory status: acceptable  Hydration status: euvolemic  Pain management: adequate

## 2023-08-17 LAB — SURGICAL PATHOLOGY REPORT: NORMAL

## 2025-06-16 ENCOUNTER — OFFICE VISIT (OUTPATIENT)
Dept: ORTHOPEDIC SURGERY | Age: 45
End: 2025-06-16
Payer: COMMERCIAL

## 2025-06-16 VITALS — WEIGHT: 210 LBS | BODY MASS INDEX: 27.83 KG/M2 | HEIGHT: 73 IN

## 2025-06-16 DIAGNOSIS — M25.562 LEFT KNEE PAIN, UNSPECIFIED CHRONICITY: Primary | ICD-10-CM

## 2025-06-16 DIAGNOSIS — M70.52 PES ANSERINUS BURSITIS OF LEFT KNEE: Primary | ICD-10-CM

## 2025-06-16 PROCEDURE — 99213 OFFICE O/P EST LOW 20 MIN: CPT | Performed by: ORTHOPAEDIC SURGERY

## 2025-06-16 RX ORDER — CELECOXIB 200 MG/1
200 CAPSULE ORAL DAILY
Qty: 30 CAPSULE | Refills: 3 | Status: SHIPPED | OUTPATIENT
Start: 2025-06-16 | End: 2025-10-14

## 2025-06-16 NOTE — PROGRESS NOTES
Chief Complaint   Patient presents with    Knee Pain     Patient presents today for left knee pain. Patient states this has been ongoing since December 2024 but thinks it could have been seen sooner than that. Patient states he hit his knee off of something at that time. Patient denies going to the emergency department. Patient states he will use Aleve every now and then but nothing else. Patient states he does not wear a brace or sleeve.        Subjective:     Patient ID: Alphonso Nunez is a 44 y.o..  male    Knee Pain  Patient complains of left knee pain. This is evaluated as a personal injury. There was a history of injury.  He stated he bumped the knee. The pain began 6 months ago. The pain is located medial. He describes  His symptoms as aching. He has not experienced popping, clicking, locking, and giving way in the affected knee.  The patient has had pain with kneeling, squating, and climbing stairs.  Symptoms improve with rest. The symptoms are worse with activity, stair climbing, kneeling. The knee has not given out or felt unstable. The patient can bend and straighten the knee fully.  The patient is active in none. Treatment to date has been NSAID's, without significant relief. The patient is working.    Past Medical History:   Diagnosis Date    Anxiety     Arthritis of left acromioclavicular joint 04/07/2018    GERD (gastroesophageal reflux disease)     Left ureteral stone      Past Surgical History:   Procedure Laterality Date    ACHILLES TENDON SURGERY Left 4/18/2019    LEFT ANKLE ACHILLES TENDON REPAIR [facility]    ANKLE SURGERY Left 04/18/2019    achilles tendon repair    COLONOSCOPY N/A 9/8/2022    COLONOSCOPY WITH BIOPSY performed by Kale Crenshaw MD at [facility] ENDOSCOPY    ENDOSCOPY, COLON, DIAGNOSTIC      dilatation esophagus    FOREARM SURGERY      left fracture    LITHOTRIPSY Left 2/15/2022    CYSTOSCOPY RETROGRADE PYELOGRAM performed

## (undated) DEVICE — YANKAUER,BULB TIP,W/O VENT,RIGID,STERILE: Brand: MEDLINE

## (undated) DEVICE — NDL CNTR 40CT FM MAG: Brand: MEDLINE INDUSTRIES, INC.

## (undated) DEVICE — SPONGE,LAP,12"X12",XR,ST,5/PK,40PK/CS: Brand: MEDLINE

## (undated) DEVICE — MASK,FACE,MAXFLUIDPROTECT,SHIELD/ERLPS: Brand: MEDLINE

## (undated) DEVICE — GAUZE,SPONGE,4"X4",8PLY,STRL,LF,10/TRAY: Brand: MEDLINE

## (undated) DEVICE — SHEET SUPPORT

## (undated) DEVICE — Z INACTIVE USE 2660664 SOLUTION IRRIG 3000ML 0.9% SOD CHL USP UROMATIC PLAS CONT

## (undated) DEVICE — Z DISCONTINUED USE 2275686 GLOVE SURG SZ 8 L12IN FNGR THK13MIL WHT ISOLEX POLYISOPRENE

## (undated) DEVICE — ADAPTER CLEANING PORPOISE CLEANING

## (undated) DEVICE — SNARE ENDOSCP L240CM DIA2.4MM LOOP W20MM RND INSUL STIFF

## (undated) DEVICE — COVER,LIGHT HANDLE,FLX,1/PK: Brand: MEDLINE INDUSTRIES, INC.

## (undated) DEVICE — CYSTO PACK: Brand: MEDLINE INDUSTRIES, INC.

## (undated) DEVICE — SET CYSTOSCOPE 21FR

## (undated) DEVICE — 3M™ STERI-DRAPE™ U-DRAPE 1015: Brand: STERI-DRAPE™

## (undated) DEVICE — GOWN ISOLATN REG YEL M WT MULTIPLY SIDETIE LEV 2

## (undated) DEVICE — Device: Brand: DEFENDO VALVE AND CONNECTOR KIT

## (undated) DEVICE — MARKER,SKIN,WI/RULER AND LABELS: Brand: MEDLINE

## (undated) DEVICE — PADDING,UNDERCAST,COTTON, 4"X4YD STERILE: Brand: MEDLINE

## (undated) DEVICE — Z INACTIVE USE 2635503 SOLUTION IRRIG 3000ML ST H2O USP UROMATIC PLAS CONT

## (undated) DEVICE — TOWEL,OR,DSP,ST,BLUE,STD,6/PK,12PK/CS: Brand: MEDLINE

## (undated) DEVICE — SUBMUCOSAL LIFTING AGENT: Brand: ORISE™ GEL

## (undated) DEVICE — CLOTH SURG PREP PREOPERATIVE CHLORHEXIDINE GLUC 2% READYPREP

## (undated) DEVICE — 6 X 9  1.75MIL 4-WALL LABGUARD: Brand: MINIGRIP COMMERCIAL LLC

## (undated) DEVICE — CONTAINER SPEC COLL 960ML POLYPR TRIANG GRAD INTAKE/OUTPUT

## (undated) DEVICE — SOLUTION SURG PREP ANTIMICROBIAL 4 OZ SKIN WND EXIDINE

## (undated) DEVICE — SET SURG BASIN MAYO REUSABLE

## (undated) DEVICE — BANDAGE COMPR L W4INXL11YD 100% COT WVN E DBL LEN CLP CLSR

## (undated) DEVICE — SOLUTION IV IRRIG POUR BRL 0.9% SODIUM CHL 2F7124

## (undated) DEVICE — KIT BEDSIDE REVITAL OX 500ML

## (undated) DEVICE — STRIP,CLOSURE,WOUND,MEDI-STRIP,1/4X3: Brand: MEDLINE

## (undated) DEVICE — CATHETER URET 5FR L70CM OPN END SGL LUMN INJ HUB FLEXIMA

## (undated) DEVICE — STANDARD HYPODERMIC NEEDLE,POLYPROPYLENE HUB: Brand: MONOJECT

## (undated) DEVICE — DOUBLE BASIN SET: Brand: MEDLINE INDUSTRIES, INC.

## (undated) DEVICE — TUBING, SUCTION, 1/4" X 10', STRAIGHT: Brand: MEDLINE

## (undated) DEVICE — BLOCK BITE 60FR CAREGUARD

## (undated) DEVICE — LUBRICANT SURG JELLY ST BACTER TUBE 4.25OZ

## (undated) DEVICE — BAG DRNGE COMB PK

## (undated) DEVICE — KENDALL 450 SERIES MONITORING FOAM ELECTRODE - RECTANGULAR SHAPE ( 3/PK): Brand: KENDALL

## (undated) DEVICE — APPLICATOR PREP 26ML 0.7% IOD POVACRYLEX 74% ISO ALC ST

## (undated) DEVICE — PENCIL ES L3M BTTN SWCH HOLSTER W/ BLDE ELECTRD EDGE

## (undated) DEVICE — GAUZE,SPONGE,4"X4",16PLY,STRL,LF,10/TRAY: Brand: MEDLINE

## (undated) DEVICE — BANDAGE COMPR W6INXL12FT SMOOTH FOR LIMB EXSANG ESMARCH

## (undated) DEVICE — BANDAGE COMPR W6INXL5YD SELF ADH COHESIVE CO FLX

## (undated) DEVICE — DRESSING GZ W1XL8IN COT XRFRM N ADH OVERWRAP CURAD

## (undated) DEVICE — SPONGE GZ 4IN 4IN 4 PLY N WVN AVANT

## (undated) DEVICE — SHEET,DRAPE,40X58,STERILE: Brand: MEDLINE

## (undated) DEVICE — LENS CYSTOSCOPE 30 DEG

## (undated) DEVICE — BANDAGE ELASTIC COMPRSS ESMRK 4.0INX3.0YD

## (undated) DEVICE — PACK,EXTREMITY,ORTHOMAX,5/CS: Brand: MEDLINE

## (undated) DEVICE — SYRINGE IRRIG 60ML SFT PLIABLE BLB EZ TO GRP 1 HND USE W/

## (undated) DEVICE — INTENDED FOR TISSUE SEPARATION, AND OTHER PROCEDURES THAT REQUIRE A SHARP SURGICAL BLADE TO PUNCTURE OR CUT.: Brand: BARD-PARKER ® STAINLESS STEEL BLADES

## (undated) DEVICE — 4-PORT MANIFOLD: Brand: NEPTUNE 2

## (undated) DEVICE — Device

## (undated) DEVICE — GOWN,SIRUS,FABRNF,XL,20/CS: Brand: MEDLINE

## (undated) DEVICE — FORCEPS BX L240CM JAW DIA2.8MM L CAP W/ NDL MIC MESH TOOTH

## (undated) DEVICE — BANDAGE E W6INXL11YD CLP CLSR DBL LEN FLEX-MASTER

## (undated) DEVICE — ELECTRODE PT RET AD L9FT HI MOIST COND ADH HYDRGEL CORDED

## (undated) DEVICE — MEDIA CONTRAST ISOVUE  300 10X50ML

## (undated) DEVICE — DISPOSABLE DISTAL ATTACHMENT: Brand: DISPOSABLE DISTAL ATTACHMENT

## (undated) DEVICE — SET MAJOR INSTR ORTHO

## (undated) DEVICE — GOWN,SIRUS,POLYRNF,BRTHSLV,XLN/XL,20/CS: Brand: MEDLINE

## (undated) DEVICE — JELLY,LUBE,STERILE,FLIP TOP,TUBE,4-OZ: Brand: MEDLINE

## (undated) DEVICE — BLADE, TONGUE, 6", STERILE: Brand: MEDLINE

## (undated) DEVICE — DRESSING ALG W2XL5IN ANTIMIC WND JUMPSTART

## (undated) DEVICE — GLOVE ORANGE PI 7 1/2   MSG9075

## (undated) DEVICE — CONTROL SYRINGE LUER-LOCK TIP: Brand: MONOJECT